# Patient Record
Sex: MALE | Race: BLACK OR AFRICAN AMERICAN | NOT HISPANIC OR LATINO
[De-identification: names, ages, dates, MRNs, and addresses within clinical notes are randomized per-mention and may not be internally consistent; named-entity substitution may affect disease eponyms.]

---

## 2021-03-09 PROBLEM — Z00.00 ENCOUNTER FOR PREVENTIVE HEALTH EXAMINATION: Status: ACTIVE | Noted: 2021-03-09

## 2021-03-10 ENCOUNTER — APPOINTMENT (OUTPATIENT)
Dept: OTOLARYNGOLOGY | Facility: CLINIC | Age: 76
End: 2021-03-10
Payer: COMMERCIAL

## 2021-03-10 VITALS — HEIGHT: 71 IN | BODY MASS INDEX: 19.6 KG/M2 | WEIGHT: 140 LBS

## 2021-03-10 DIAGNOSIS — Z82.49 FAMILY HISTORY OF ISCHEMIC HEART DISEASE AND OTHER DISEASES OF THE CIRCULATORY SYSTEM: ICD-10-CM

## 2021-03-10 DIAGNOSIS — Z86.79 PERSONAL HISTORY OF OTHER DISEASES OF THE CIRCULATORY SYSTEM: ICD-10-CM

## 2021-03-10 DIAGNOSIS — Z78.9 OTHER SPECIFIED HEALTH STATUS: ICD-10-CM

## 2021-03-10 DIAGNOSIS — Z79.899 OTHER LONG TERM (CURRENT) DRUG THERAPY: ICD-10-CM

## 2021-03-10 PROCEDURE — 99203 OFFICE O/P NEW LOW 30 MIN: CPT

## 2021-03-10 PROCEDURE — 99072 ADDL SUPL MATRL&STAF TM PHE: CPT

## 2021-03-11 PROBLEM — Z86.79 HISTORY OF HYPERTENSION: Status: RESOLVED | Noted: 2021-03-10 | Resolved: 2021-03-11

## 2021-03-11 PROBLEM — Z79.899 MEDICAL MARIJUANA USE: Status: ACTIVE | Noted: 2021-03-11

## 2021-03-11 PROBLEM — Z82.49 FAMILY HISTORY OF HYPERTENSION: Status: ACTIVE | Noted: 2021-03-10

## 2021-03-11 PROBLEM — Z78.9 DRINKS WINE: Status: ACTIVE | Noted: 2021-03-10

## 2021-03-11 NOTE — REVIEW OF SYSTEMS
[Patient Intake Form Reviewed] : Patient intake form was reviewed [Throat Pain] : throat pain [As Noted in HPI] : as noted in HPI [Feeling Poorly] : feeling poorly [Recent Weight Loss (___ Lbs)] : recent [unfilled] ~Ulb weight loss [Negative] : Heme/Lymph

## 2021-03-18 NOTE — HISTORY OF PRESENT ILLNESS
[de-identified] : 75M with hx HTN and colon resection for obstruction, presents with left submandibular mass x many years.  It will occasional become painful and swollen and usually resolves with ABX.  It is now swollen and painful x 2.5 weeks, he was given Bactrim without relief.  It is causing difficulty swallowing and eating due to pain, + weight loss.  He does not recall any prior imaging done.  His intake form/history erroneously mentions thyroid when he meant L SMG.

## 2021-03-18 NOTE — ASSESSMENT
[FreeTextEntry1] : 75M with large left SMG mass x many years, now with pain, difficulty eating and weight loss.  Discussed possibility of chronic sialoadenitis vs malignancy.\par \par Plan:\par - MRI to assess left SMG mass and r/o malignancy.\par - Will likely need FNA and excision.\par - Will call pt with MRI results and plan.\par - Pt verbalized understanding of above.

## 2021-03-18 NOTE — PHYSICAL EXAM
[Normal] : orientation to person, place, and time: normal [FreeTextEntry1] : normal appearance, well groomed, thin, in no acute distress [de-identified] : large, firm 4 cm left submandibular mass, abutting the floor of mouth mucosa, parotid and thyroid WNL

## 2021-03-21 ENCOUNTER — APPOINTMENT (OUTPATIENT)
Dept: MRI IMAGING | Facility: HOSPITAL | Age: 76
End: 2021-03-21
Payer: COMMERCIAL

## 2021-03-22 ENCOUNTER — RESULT REVIEW (OUTPATIENT)
Age: 76
End: 2021-03-22

## 2021-03-22 ENCOUNTER — OUTPATIENT (OUTPATIENT)
Dept: OUTPATIENT SERVICES | Facility: HOSPITAL | Age: 76
LOS: 1 days | End: 2021-03-22
Payer: COMMERCIAL

## 2021-03-22 PROCEDURE — A9585: CPT

## 2021-03-22 PROCEDURE — 70543 MRI ORBT/FAC/NCK W/O &W/DYE: CPT

## 2021-03-22 PROCEDURE — 70543 MRI ORBT/FAC/NCK W/O &W/DYE: CPT | Mod: 26

## 2021-04-03 ENCOUNTER — NON-APPOINTMENT (OUTPATIENT)
Age: 76
End: 2021-04-03

## 2021-04-08 ENCOUNTER — NON-APPOINTMENT (OUTPATIENT)
Age: 76
End: 2021-04-08

## 2021-04-08 ENCOUNTER — APPOINTMENT (OUTPATIENT)
Dept: INTERNAL MEDICINE | Facility: CLINIC | Age: 76
End: 2021-04-08
Payer: COMMERCIAL

## 2021-04-08 VITALS
TEMPERATURE: 98.5 F | SYSTOLIC BLOOD PRESSURE: 154 MMHG | BODY MASS INDEX: 20.13 KG/M2 | DIASTOLIC BLOOD PRESSURE: 78 MMHG | OXYGEN SATURATION: 96 % | WEIGHT: 144.3 LBS | HEART RATE: 97 BPM

## 2021-04-08 DIAGNOSIS — I10 ESSENTIAL (PRIMARY) HYPERTENSION: ICD-10-CM

## 2021-04-08 DIAGNOSIS — K50.90 CROHN'S DISEASE, UNSPECIFIED, W/OUT COMPLICATIONS: ICD-10-CM

## 2021-04-08 DIAGNOSIS — Z01.818 ENCOUNTER FOR OTHER PREPROCEDURAL EXAMINATION: ICD-10-CM

## 2021-04-08 PROCEDURE — 99204 OFFICE O/P NEW MOD 45 MIN: CPT | Mod: 25

## 2021-04-08 PROCEDURE — 93000 ELECTROCARDIOGRAM COMPLETE: CPT

## 2021-04-08 PROCEDURE — 99072 ADDL SUPL MATRL&STAF TM PHE: CPT

## 2021-04-08 RX ORDER — HYDROCHLOROTHIAZIDE 12.5 MG/1
12.5 TABLET ORAL DAILY
Qty: 90 | Refills: 3 | Status: ACTIVE | COMMUNITY

## 2021-04-09 LAB
ALBUMIN SERPL ELPH-MCNC: 4.5 G/DL
ALP BLD-CCNC: 113 U/L
ALT SERPL-CCNC: 16 U/L
ANION GAP SERPL CALC-SCNC: 12 MMOL/L
APPEARANCE: CLEAR
APTT BLD: 35.9 SEC
AST SERPL-CCNC: 18 U/L
BASOPHILS # BLD AUTO: 0.01 K/UL
BASOPHILS NFR BLD AUTO: 0.2 %
BILIRUB SERPL-MCNC: 0.6 MG/DL
BILIRUBIN URINE: NEGATIVE
BLOOD URINE: NEGATIVE
BUN SERPL-MCNC: 26 MG/DL
CALCIUM SERPL-MCNC: 11 MG/DL
CHLORIDE SERPL-SCNC: 103 MMOL/L
CO2 SERPL-SCNC: 29 MMOL/L
COLOR: NORMAL
CREAT SERPL-MCNC: 1.25 MG/DL
EOSINOPHIL # BLD AUTO: 0.06 K/UL
EOSINOPHIL NFR BLD AUTO: 0.9 %
GLUCOSE QUALITATIVE U: NEGATIVE
GLUCOSE SERPL-MCNC: 96 MG/DL
HCT VFR BLD CALC: 36.6 %
HGB BLD-MCNC: 11.6 G/DL
IMM GRANULOCYTES NFR BLD AUTO: 0.3 %
INR PPP: 0.96 RATIO
KETONES URINE: NEGATIVE
LEUKOCYTE ESTERASE URINE: NEGATIVE
LYMPHOCYTES # BLD AUTO: 1.66 K/UL
LYMPHOCYTES NFR BLD AUTO: 25.9 %
MAN DIFF?: NORMAL
MCHC RBC-ENTMCNC: 31 PG
MCHC RBC-ENTMCNC: 31.7 GM/DL
MCV RBC AUTO: 97.9 FL
MONOCYTES # BLD AUTO: 0.6 K/UL
MONOCYTES NFR BLD AUTO: 9.4 %
NEUTROPHILS # BLD AUTO: 4.06 K/UL
NEUTROPHILS NFR BLD AUTO: 63.3 %
NITRITE URINE: NEGATIVE
PH URINE: 6
PLATELET # BLD AUTO: 217 K/UL
POTASSIUM SERPL-SCNC: 4 MMOL/L
PROT SERPL-MCNC: 7 G/DL
PROTEIN URINE: NEGATIVE
PT BLD: 11.3 SEC
RBC # BLD: 3.74 M/UL
RBC # FLD: 14.5 %
SODIUM SERPL-SCNC: 144 MMOL/L
SPECIFIC GRAVITY URINE: 1.02
UROBILINOGEN URINE: NORMAL
WBC # FLD AUTO: 6.41 K/UL

## 2021-04-09 NOTE — HISTORY OF PRESENT ILLNESS
[No Pertinent Cardiac History] : no history of aortic stenosis, atrial fibrillation, coronary artery disease, recent myocardial infarction, or implantable device/pacemaker [No Pertinent Pulmonary History] : no history of asthma, COPD, sleep apnea, or smoking [No Adverse Anesthesia Reaction] : no adverse anesthesia reaction in self or family member [(Patient denies any chest pain, claudication, dyspnea on exertion, orthopnea, palpitations or syncope)] : Patient denies any chest pain, claudication, dyspnea on exertion, orthopnea, palpitations or syncope [Excellent (>10 METs)] : Excellent (>10 METs) [Chronic Anticoagulation] : no chronic anticoagulation [Chronic Kidney Disease] : no chronic kidney disease [Diabetes] : no diabetes [FreeTextEntry1] : LEFT submandibular mass excision [FreeTextEntry2] : 4/22/2021 [FreeTextEntry3] : Dr. Maya [FreeTextEntry4] : LEFT submandibular swelling, intermittent over the past 2 months, found to have large stone on MRI. scheduled for excision.

## 2021-04-14 DIAGNOSIS — Z01.818 ENCOUNTER FOR OTHER PREPROCEDURAL EXAMINATION: ICD-10-CM

## 2021-04-24 ENCOUNTER — APPOINTMENT (OUTPATIENT)
Dept: DISASTER EMERGENCY | Facility: CLINIC | Age: 76
End: 2021-04-24

## 2021-04-24 ENCOUNTER — LABORATORY RESULT (OUTPATIENT)
Age: 76
End: 2021-04-24

## 2021-04-26 ENCOUNTER — OUTPATIENT (OUTPATIENT)
Dept: OUTPATIENT SERVICES | Facility: HOSPITAL | Age: 76
LOS: 1 days | End: 2021-04-26
Payer: COMMERCIAL

## 2021-04-26 ENCOUNTER — TRANSCRIPTION ENCOUNTER (OUTPATIENT)
Age: 76
End: 2021-04-26

## 2021-04-26 PROCEDURE — 71045 X-RAY EXAM CHEST 1 VIEW: CPT

## 2021-04-26 PROCEDURE — 71045 X-RAY EXAM CHEST 1 VIEW: CPT | Mod: 26

## 2021-04-27 ENCOUNTER — INPATIENT (INPATIENT)
Facility: HOSPITAL | Age: 76
LOS: 0 days | Discharge: HOME | DRG: 139 | End: 2021-04-28
Attending: OTOLARYNGOLOGY | Admitting: OTOLARYNGOLOGY
Payer: COMMERCIAL

## 2021-04-27 ENCOUNTER — APPOINTMENT (OUTPATIENT)
Dept: OTOLARYNGOLOGY | Facility: HOSPITAL | Age: 76
End: 2021-04-27

## 2021-04-27 ENCOUNTER — RESULT REVIEW (OUTPATIENT)
Age: 76
End: 2021-04-27

## 2021-04-27 ENCOUNTER — OUTPATIENT (OUTPATIENT)
Dept: OUTPATIENT SERVICES | Facility: HOSPITAL | Age: 76
LOS: 1 days | Discharge: HOME | End: 2021-04-27
Payer: COMMERCIAL

## 2021-04-27 VITALS
DIASTOLIC BLOOD PRESSURE: 65 MMHG | TEMPERATURE: 99 F | SYSTOLIC BLOOD PRESSURE: 142 MMHG | OXYGEN SATURATION: 98 % | RESPIRATION RATE: 18 BRPM | HEART RATE: 58 BPM

## 2021-04-27 PROCEDURE — 31237 NSL/SINS NDSC SURG BX POLYPC: CPT | Mod: LT

## 2021-04-27 PROCEDURE — 42440 EXCISE SUBMAXILLARY GLAND: CPT | Mod: LT

## 2021-04-27 PROCEDURE — 88331 PATH CONSLTJ SURG 1 BLK 1SPC: CPT | Mod: 26

## 2021-04-27 PROCEDURE — 92516 FACIAL NERVE FUNCTION TEST: CPT

## 2021-04-27 PROCEDURE — 88305 TISSUE EXAM BY PATHOLOGIST: CPT | Mod: 26

## 2021-04-27 RX ORDER — AMLODIPINE BESYLATE 2.5 MG/1
5 TABLET ORAL DAILY
Refills: 0 | Status: DISCONTINUED | OUTPATIENT
Start: 2021-04-27 | End: 2021-04-28

## 2021-04-27 RX ORDER — HYDROMORPHONE HYDROCHLORIDE 2 MG/ML
1 INJECTION INTRAMUSCULAR; INTRAVENOUS; SUBCUTANEOUS EVERY 4 HOURS
Refills: 0 | Status: DISCONTINUED | OUTPATIENT
Start: 2021-04-27 | End: 2021-04-28

## 2021-04-27 RX ORDER — ONDANSETRON 8 MG/1
4 TABLET, FILM COATED ORAL EVERY 6 HOURS
Refills: 0 | Status: DISCONTINUED | OUTPATIENT
Start: 2021-04-27 | End: 2021-04-28

## 2021-04-27 RX ORDER — ACETAMINOPHEN 500 MG
650 TABLET ORAL EVERY 6 HOURS
Refills: 0 | Status: DISCONTINUED | OUTPATIENT
Start: 2021-04-27 | End: 2021-04-28

## 2021-04-27 RX ORDER — OXYCODONE HYDROCHLORIDE 5 MG/1
5 TABLET ORAL EVERY 6 HOURS
Refills: 0 | Status: DISCONTINUED | OUTPATIENT
Start: 2021-04-27 | End: 2021-04-28

## 2021-04-27 RX ORDER — LANOLIN ALCOHOL/MO/W.PET/CERES
3 CREAM (GRAM) TOPICAL AT BEDTIME
Refills: 0 | Status: DISCONTINUED | OUTPATIENT
Start: 2021-04-27 | End: 2021-04-28

## 2021-04-27 NOTE — H&P ADULT - HISTORY OF PRESENT ILLNESS
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 Transferred to [facility] for 23 hr obs.     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

## 2021-04-28 ENCOUNTER — TRANSCRIPTION ENCOUNTER (OUTPATIENT)
Age: 76
End: 2021-04-28

## 2021-04-28 VITALS
TEMPERATURE: 99 F | DIASTOLIC BLOOD PRESSURE: 71 MMHG | HEART RATE: 61 BPM | SYSTOLIC BLOOD PRESSURE: 145 MMHG | RESPIRATION RATE: 18 BRPM | OXYGEN SATURATION: 98 %

## 2021-04-28 LAB
COVID-19 SPIKE DOMAIN AB INTERP: NEGATIVE — SIGNIFICANT CHANGE UP
COVID-19 SPIKE DOMAIN ANTIBODY RESULT: 0.4 U/ML — SIGNIFICANT CHANGE UP
HCV AB S/CO SERPL IA: 0.03 S/CO — LOW
HCV AB SERPL-IMP: SIGNIFICANT CHANGE UP
SARS-COV-2 IGG+IGM SERPL QL IA: 0.4 U/ML — SIGNIFICANT CHANGE UP
SARS-COV-2 IGG+IGM SERPL QL IA: NEGATIVE — SIGNIFICANT CHANGE UP

## 2021-04-28 PROCEDURE — 86803 HEPATITIS C AB TEST: CPT

## 2021-04-28 PROCEDURE — 36415 COLL VENOUS BLD VENIPUNCTURE: CPT

## 2021-04-28 PROCEDURE — 86769 SARS-COV-2 COVID-19 ANTIBODY: CPT

## 2021-04-28 RX ADMIN — AMLODIPINE BESYLATE 5 MILLIGRAM(S): 2.5 TABLET ORAL at 13:20

## 2021-04-28 NOTE — DISCHARGE NOTE NURSING/CASE MANAGEMENT/SOCIAL WORK - PATIENT PORTAL LINK FT
You can access the FollowMyHealth Patient Portal offered by [facility] by registering at the following website: [email or web address]. By joining [employer]’s FollowMyHealth portal, you will also be able to view your health information using other applications (apps) compatible with our system.

## 2021-04-28 NOTE — DISCHARGE NOTE PROVIDER - NSDCCPCAREPLAN_GEN_ALL_CORE_FT
PRINCIPAL DISCHARGE DIAGNOSIS  Diagnosis: Sialolithiasis  Assessment and Plan of Treatment: You had a submandibular gland stone that was causing inflammation/infection of your submandibular gland. this most often occurs due to chronic dehydration. It is very important to hydrate with 8 glasses of water daily.

## 2021-04-28 NOTE — PROGRESS NOTE ADULT - SUBJECTIVE AND OBJECTIVE BOX
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 Transferred to [facility] for 23 hr obs.     INTERVAL:  4/28: NAEON. Pain well controlled. No other issues.    PAST MEDICAL & SURGICAL HISTORY:    No Known Allergies    MEDICATIONS  (STANDING):  amLODIPine   Tablet 5 milliGRAM(s) Oral daily    MEDICATIONS  (PRN):  acetaminophen    Suspension .. 650 milliGRAM(s) Oral every 6 hours PRN Mild Pain (1 - 3)  HYDROmorphone  Injectable 1 milliGRAM(s) IV Push every 4 hours PRN Severe Pain (7 - 10)  melatonin 3 milliGRAM(s) Oral at bedtime PRN Insomnia  ondansetron Injectable 4 milliGRAM(s) IV Push every 6 hours PRN Nausea  oxyCODONE    IR 5 milliGRAM(s) Oral every 6 hours PRN Moderate Pain (4 - 6)      Objective    ICU Vital Signs Last 24 Hrs  T(C): 36.9 (28 Apr 2021 04:43), Max: 37 (27 Apr 2021 21:46)  T(F): 98.4 (28 Apr 2021 04:43), Max: 98.6 (27 Apr 2021 21:46)  HR: 58 (28 Apr 2021 04:43) (54 - 58)  BP: 120/66 (28 Apr 2021 04:43) (117/64 - 142/65)  BP(mean): --  ABP: --  ABP(mean): --  RR: 18 (28 Apr 2021 04:43) (18 - 18)  SpO2: 96% (28 Apr 2021 04:43) (96% - 98%)      PHYSICAL EXAM:    CONSTITUTIONAL: Well nourished, well developed, NON-DYSMORPHIC, and in no acute distress.    HEAD: normocephalic, atraumatic.  EARS: The right/left pinna was normal.   NOSE: Normal external nose.   ORAL CAVITY/OROPHARYNX: normal mucosa. No erythema, lesions or bleeding.  NECK: L neck incision c/d/i  RESPIRATORY: Respirations unlabored, no increased work of breathing with use of accessory muscles and retractions. No stridor.  CARDIAC: Warm extremities, no cyanosis.               I&O's Summary    27 Apr 2021 07:01 - 28 Apr 2021 06:29  --------------------------------------------------------  IN: 0 mL / OUT: 555 mL / NET: -555 mL

## 2021-04-28 NOTE — DISCHARGE NOTE PROVIDER - NSDCFUADDINST_GEN_ALL_CORE_FT
General Discharge Instructions:  Please resume all regular home medications unless specifically advised not to take a particular medication. Also, please take any new medications as prescribed.  Please get plenty of rest, continue to ambulate several times per day, and drink adequate amounts of fluids. Avoid lifting weights greater than 5-10 lbs until you follow-up with your surgeon, who will instruct you further regarding activity restrictions.  Avoid driving or operating heavy machinery while taking pain medications.  Please follow-up with your surgeon and Primary Care Provider (PCP) as advised.  Incision Care:  *Please call your doctor or nurse practitioner if you have increased pain, swelling, redness, or drainage from the incision site.  *Avoid swimming and baths until your follow-up appointment.  *You may shower, and wash surgical incisions with a mild soap and warm water. Gently pat the area dry.  *If you have steri-strips, they will fall off on their own. Please remove any remaining strips 7-10 days after surgery.    Warning Signs:  Please call your doctor or nurse practitioner if you experience the following:  *You experience new chest pain, pressure, squeezing or tightness.  *New or worsening cough, shortness of breath, or wheeze.  *If you are vomiting and cannot keep down fluids or your medications.  *You are getting dehydrated due to continued vomiting, diarrhea, or other reasons. Signs of dehydration include dry mouth, rapid heartbeat, or feeling dizzy or faint when standing.  *You experience burning when you urinate, have blood in your urine, or experience a discharge.  *Your pain is not improving within 8-12 hours or is not gone within 24 hours.  *You have shaking chills, or fever greater than 101.5 degrees Fahrenheit or 38 degrees Celsius.  *Any change in your symptoms, or any new symptoms that concern you.

## 2021-04-28 NOTE — DISCHARGE NOTE PROVIDER - PROVIDER TOKENS
PROVIDER:[TOKEN:[[id number]]] PROVIDER:[TOKEN:[[id number]],SCHEDULEDAPPT:[05/05/2021],ESTABLISHEDPATIENT:[T]]

## 2021-04-28 NOTE — DISCHARGE NOTE PROVIDER - CARE PROVIDERS DIRECT ADDRESSES
[email or web address]

## 2021-04-28 NOTE — PROGRESS NOTE ADULT - ASSESSMENT
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 Transferred to [facility] for 23 hr obs    - Reg diet/dc fluids when tolerating  - Pain/nausea control  - FARHAD monitoring  - SCDs  - Home meds

## 2021-04-28 NOTE — DISCHARGE NOTE PROVIDER - NSDCCPTREATMENT_GEN_ALL_CORE_FT
PRINCIPAL PROCEDURE  Procedure: Excision, submandibular gland  Findings and Treatment: We removed part of the submandibular gland to be able to remove a stone from the gland causing you chronic infection/inflammation as well we performed a biopsy of a nasal lesion. Your pathology results will come back in 5-7 buisiness days and you will go over them with Dr. Maya in office.  Please keep hydrated.

## 2021-04-28 NOTE — DISCHARGE NOTE PROVIDER - HOSPITAL COURSE
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 Transferred to [facility] for 23 hr obs.     No acute events overnight. Pt is breathing comfortably on room air. Tolerating diet with no nausea/vomiting. Pain well controlled on current regimen. Ready for discharge home.

## 2021-04-28 NOTE — DISCHARGE NOTE NURSING/CASE MANAGEMENT/SOCIAL WORK - NSDCPNINST_GEN_ALL_CORE
Call Dr. Maya if you have any questions or concerns. Due to pandemic corona virus- please continue proper handwashing/hand hygiene, wearing mask, social distancing. Educational material given- Surgical Wound Discharge Instructions ([employer]).

## 2021-04-28 NOTE — DISCHARGE NOTE PROVIDER - CARE PROVIDER_API CALL
Richard Maya)  Otolaryngology  [address] [place] [address]  Phone: [phone]  Fax: [phone]  Follow Up Time:    Richard Maya)  Otolaryngology  [address] [place] [address]  Phone: [phone]  Fax: [phone]  Established Patient  Scheduled Appointment: 05/05/2021

## 2021-04-29 LAB — SURGICAL PATHOLOGY STUDY: SIGNIFICANT CHANGE UP

## 2021-05-04 DIAGNOSIS — K11.23 CHRONIC SIALOADENITIS: ICD-10-CM

## 2021-05-04 DIAGNOSIS — K50.90 CROHN'S DISEASE, UNSPECIFIED, WITHOUT COMPLICATIONS: ICD-10-CM

## 2021-05-04 DIAGNOSIS — K11.5 SIALOLITHIASIS: ICD-10-CM

## 2021-05-04 DIAGNOSIS — D36.7 BENIGN NEOPLASM OF OTHER SPECIFIED SITES: ICD-10-CM

## 2021-05-04 DIAGNOSIS — I10 ESSENTIAL (PRIMARY) HYPERTENSION: ICD-10-CM

## 2021-05-05 ENCOUNTER — APPOINTMENT (OUTPATIENT)
Dept: OTOLARYNGOLOGY | Facility: CLINIC | Age: 76
End: 2021-05-05
Payer: COMMERCIAL

## 2021-05-05 DIAGNOSIS — R22.0 LOCALIZED SWELLING, MASS AND LUMP, HEAD: ICD-10-CM

## 2021-05-05 DIAGNOSIS — D14.0 BENIGN NEOPLASM OF MIDDLE EAR, NASAL CAVITY AND ACCESSORY SINUSES: ICD-10-CM

## 2021-05-05 PROCEDURE — 99024 POSTOP FOLLOW-UP VISIT: CPT

## 2021-05-05 NOTE — ASSESSMENT
[FreeTextEntry1] : 75M s/p 4/27/21 left submandibular excision and left nasal biopsy for submandibular sialolithiasis, and nasal inverted papilloma. \par We thoroughly discussed the diagnosis with the patient and explained the required workup, f/u and treatment options.\par \par Plan:\par - OR for endoscopic medial maxillectomy (CT guidance)\par - Please apply vitamin E cream over scar, avoid sun exposure.\par - You can wash the scar with water and soap, scrub gently.\par - f/u 4-6 weeks.\par - if any concerns/questions arise, please contact our clinic. \par - Pt verbalized understanding of above.\par

## 2021-05-05 NOTE — REVIEW OF SYSTEMS
[Throat Pain] : throat pain [As Noted in HPI] : as noted in HPI [Feeling Poorly] : feeling poorly [Recent Weight Loss (___ Lbs)] : recent [unfilled] ~Ulb weight loss [Negative] : Heme/Lymph

## 2021-05-05 NOTE — DATA REVIEWED
[No studies available for review at this time] : No studies available for review at this time [de-identified] : final path.

## 2021-05-05 NOTE — REASON FOR VISIT
[Post-Operative Visit] : a post-operative visit [FreeTextEntry1] : s/p left submandibular excision and left nasal biopsy

## 2021-05-05 NOTE — HISTORY OF PRESENT ILLNESS
[de-identified] : 75M with hx HTN and colon resection for obstruction, presents with left submandibular mass x many years.  It will occasional become painful and swollen and usually resolves with ABX.  It is now swollen and painful x 2.5 weeks, he was given Bactrim without relief.  It is causing difficulty swallowing and eating due to pain, + weight loss.  He does not recall any prior imaging done.  His intake form/history erroneously mentions thyroid when he meant L SMG.\par \par MRI scan revealed a left inflamed submandibular with a large sialolithiasis, and an incidental unilateral left mid-meatal nasal polyp.  [FreeTextEntry1] : s/p 4/27/21 left submandibular excision and left nasal biopsy\par pt. denies pain, denies neck swelling/redness/discharge. denies epistaxis/nasal complains. reports mild tongue "tightness".\par  \par final path: \par 1. Left lateral nasal wall neoplasm:\par - Inverted papilloma\par \par 2. Left submandibular gland and stone:\par - Chronic sialoadenitis\par - Calculus, gross diagnosis

## 2021-05-05 NOTE — PHYSICAL EXAM
[Normal] : orientation to person, place, and time: normal [FreeTextEntry1] : normal appearance, well groomed, thin, in no acute distress [de-identified] : well healing neck scar, no swelling/redness/discharge, stitches were removed.

## 2022-04-06 ENCOUNTER — APPOINTMENT (OUTPATIENT)
Dept: ORTHOPEDIC SURGERY | Facility: CLINIC | Age: 77
End: 2022-04-06
Payer: COMMERCIAL

## 2022-04-06 VITALS — WEIGHT: 144 LBS | BODY MASS INDEX: 20.16 KG/M2 | HEIGHT: 71 IN

## 2022-04-06 DIAGNOSIS — R29.898 OTHER SYMPTOMS AND SIGNS INVOLVING THE MUSCULOSKELETAL SYSTEM: ICD-10-CM

## 2022-04-06 PROCEDURE — 99212 OFFICE O/P EST SF 10 MIN: CPT

## 2022-04-07 NOTE — ASSESSMENT
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 \par Discussed recommendations to obtain MRI of the right shoulder to r/o RCT.\par reports abdominal pain with some nsaids. Prescribed Mobic.\par May be developing frozen shoulder\par \par Impression: right shoulder\par 1. Moderate partial tearing of the supraspinatus tendon insertion, mild partial tearing of the subscapularis tendon insertion and moderate biceps tenosynovitis with superior and anterior labral tearing, moderate effusion and moderate capsulitis.\par 2. AC joint arthrosis.\par 3. Moderate subacromial bursitis.\par 4. No acute fracture or disproportionate muscle atrophy.\par E- signed by Tu Castaneda MD 10/30/2021\par I reviewed and agree with the results. \par \par Discussed MRI findings and pathology of the shoulder, No surgical intervention recommended, Incomplete tears w/ global inflammation. Recommended CSI injection, PT elected to proceed with injection.\par \par Will repeat PT\par Due to weakness, obtain EMG testing of RUE to r/o cervical radiculopathy\par \par Impressions: EMG RUE\par 1. Right sided, mild, acute and chronic C5 C6 radiculopathy. 2. Mild R sensorimotor demyelinating median nerve neuropathy at the wrist. This is consistent with the clinical diagnosis of mild right carpal tunnel syndrome. \par 3. R sensorimotor demyelinating ulnar nerve neuropathy at the elbow. This is consistent with the clinical diagnosis of mild right cubital tunnel syndrome. \par Mark Veronica 3/30/22\par I reviewed and agree with the results. \par \par Underlying pathology reviewed\par Treatment options discussed. \par HEP encouraged. \par Minimal relief previous steroid injection. \par Prescribed mobic\par Questions addressed.\par f/u 4 wks or as needed. \par

## 2022-04-07 NOTE — PHYSICAL EXAM
[Right] : right shoulder [4 ___] : forward flexion 4[unfilled]/5 [4___] : abduction 4[unfilled]/5 [] : no sensory deficits

## 2022-04-07 NOTE — DATA REVIEWED
[Report was reviewed and noted in the chart] : The report was reviewed and noted in the chart [FreeTextEntry1] : EMG RUE

## 2022-04-07 NOTE — HISTORY OF PRESENT ILLNESS
[Gradual] : gradual [7] : 7 [4] : 4 [Radiating] : radiating [Tightness] : tightness [Occasional] : occasional [Work] : work [Sleep] : sleep [Rest] : rest [Meds] : meds [Ice] : ice [Heat] : heat [Massage] : massage [Physical therapy] : physical therapy [Lying in bed] : lying in bed [de-identified] : Last Note: 3/23/22: f/u on the right shoulder, some pain with specific motions.   11/3/21: PT present to review MRI of the right shoulder\par \par 11/3/21: PT present to review MRI of the right shoulder\par \par 10/27/2021: 75YO male patient present for right shoulder pain few months. PT reports gradually worsening pain, and possibly a result of repetitive motions. [] : no [de-identified] : reaching out  [de-identified] : 4/2022 [de-identified] : Physical therapy

## 2022-04-29 ENCOUNTER — NON-APPOINTMENT (OUTPATIENT)
Age: 77
End: 2022-04-29

## 2022-04-29 ENCOUNTER — APPOINTMENT (OUTPATIENT)
Dept: UROLOGY | Facility: CLINIC | Age: 77
End: 2022-04-29
Payer: COMMERCIAL

## 2022-04-29 VITALS
HEART RATE: 94 BPM | BODY MASS INDEX: 20.3 KG/M2 | SYSTOLIC BLOOD PRESSURE: 142 MMHG | TEMPERATURE: 98.7 F | WEIGHT: 145 LBS | DIASTOLIC BLOOD PRESSURE: 80 MMHG | HEIGHT: 71 IN

## 2022-04-29 PROCEDURE — 76857 US EXAM PELVIC LIMITED: CPT

## 2022-04-29 PROCEDURE — 99203 OFFICE O/P NEW LOW 30 MIN: CPT

## 2022-04-29 NOTE — HISTORY OF PRESENT ILLNESS
[FreeTextEntry1] : 75 YO M seen TODAY 4/29/2022 as NPT for elevated PSA. He told me that he has had a PSA of ~4 since 7 years ago when he underwent a PNB for it that returned BPH by history. HE had a recent PSA done 4/22/2022 which returned 4.2 (19%). He has also had chronic pelvic pain and is concerned tat he might have prostate cancer.\par \par Patient describes his urination as weak flow but no nocturia, dysuria or hematuria. \par UA negative\par IPSS 2\par ADRIENNE 24\par KIMO 2\par Pelvic US today:\par Bladder PVR: 12 cc\par PRostate 28 gm\par \par Patient has chronic right shoulder pain and is on pain medication for this. Also on olmesartan and amlodipine for HTN, Crohn's disease on no medication at present. NKMA.\par  The patient denies fevers, chills, nausea and or vomiting and no unexplained weight loss. \par All pertinent parts of the patient PFSH (past medical, family and social histories), laboratory, radiological studies and physician notes were reviewed prior to starting the face to face portion of the  visit. Questionnaire results were discussed with patient.\par

## 2022-04-29 NOTE — ASSESSMENT
[FreeTextEntry1] : 76-year-old male with a high normal for age and stable PSA in the 4 range but a abnormally low percent free PSA of 19%.  Digital rectal exam today is normal.  We discussed the implications of these findings as they coincide with the chance of prostate cancer.  I also discussed with the patient normal ranges for PSA for his age which would include a total PSA up to 5-5.5.  We also discussed the length the time of progression of prostate cancer which is measured in years or decades rather than weeks or months.  I repeated the total and free PSA today for confirmation.  If this remains in the same range, then I recommended to the patient that he undergo repeat evaluation with LINDA and PSA in 6 months and we planned on that follow-up.  Of course we will evaluate any further abnormality as we find it.  We discussed the very minimal risk that waiting 6 months would cause us to lose any window of opportunity for treatment of localized disease versus more generalized spread, especially in view of the relatively normal PSA value he has right now.  We also discussed the fact that most prostate cancers carry a low likelihood of metastasis and can be observed rather than treated.\par \par I believe I answered patient's questions to his satisfaction and he is satisfied with the present course of action. Ella Loco MD\par

## 2022-04-29 NOTE — LETTER BODY
[Dear  ___] : Dear  [unfilled], [Consult Letter:] : I had the pleasure of evaluating your patient, [unfilled]. [Please see my note below.] : Please see my note below. [Consult Closing:] : Thank you very much for allowing me to participate in the care of this patient.  If you have any questions, please do not hesitate to contact me. [FreeTextEntry3] : Best Regards, \par \par Ella Loco MD\par

## 2022-04-29 NOTE — PHYSICAL EXAM
[General Appearance - Well Developed] : well developed [General Appearance - Well Nourished] : well nourished [Normal Appearance] : normal appearance [Well Groomed] : well groomed [General Appearance - In No Acute Distress] : no acute distress [Edema] : no peripheral edema [Respiration, Rhythm And Depth] : normal respiratory rhythm and effort [Exaggerated Use Of Accessory Muscles For Inspiration] : no accessory muscle use [Abdomen Soft] : soft [Abdomen Tenderness] : non-tender [Costovertebral Angle Tenderness] : no ~M costovertebral angle tenderness [Urethral Meatus] : meatus normal [Penis Abnormality] : normal uncircumcised penis [Urinary Bladder Findings] : the bladder was normal on palpation [Scrotum] : the scrotum was normal [Epididymis] : the epididymides were normal [Testes Tenderness] : no tenderness of the testes [Testes Mass (___cm)] : there were no testicular masses [Prostate Tenderness] : the prostate was not tender [No Prostate Nodules] : no prostate nodules [Prostate Size ___ gm] : prostate size [unfilled] gm [Normal Station and Gait] : the gait and station were normal for the patient's age [] : no rash [No Focal Deficits] : no focal deficits [Oriented To Time, Place, And Person] : oriented to person, place, and time [Affect] : the affect was normal [Mood] : the mood was normal [Not Anxious] : not anxious

## 2022-05-05 LAB
BILIRUB UR QL STRIP: NORMAL
CLARITY UR: CLEAR
COLLECTION METHOD: NORMAL
GLUCOSE UR-MCNC: NORMAL
HCG UR QL: 0.2 EU/DL
HGB UR QL STRIP.AUTO: NORMAL
KETONES UR-MCNC: NORMAL
LEUKOCYTE ESTERASE UR QL STRIP: NORMAL
NITRITE UR QL STRIP: NORMAL
PH UR STRIP: 5.5
PROT UR STRIP-MCNC: NORMAL
PSA FREE FLD-MCNC: 15 %
PSA FREE SERPL-MCNC: 0.86 NG/ML
PSA SERPL-MCNC: 5.88 NG/ML
SP GR UR STRIP: 1.02

## 2022-05-09 ENCOUNTER — NON-APPOINTMENT (OUTPATIENT)
Age: 77
End: 2022-05-09

## 2022-05-11 ENCOUNTER — NON-APPOINTMENT (OUTPATIENT)
Age: 77
End: 2022-05-11

## 2022-05-12 ENCOUNTER — NON-APPOINTMENT (OUTPATIENT)
Age: 77
End: 2022-05-12

## 2022-05-13 ENCOUNTER — APPOINTMENT (OUTPATIENT)
Dept: UROLOGY | Facility: CLINIC | Age: 77
End: 2022-05-13
Payer: COMMERCIAL

## 2022-05-13 VITALS — TEMPERATURE: 94.6 F | DIASTOLIC BLOOD PRESSURE: 84 MMHG | SYSTOLIC BLOOD PRESSURE: 144 MMHG | HEART RATE: 89 BPM

## 2022-05-13 LAB
BILIRUB UR QL STRIP: NORMAL
CLARITY UR: CLEAR
COLLECTION METHOD: NORMAL
GLUCOSE UR-MCNC: NORMAL
HCG UR QL: 0.2 EU/DL
HGB UR QL STRIP.AUTO: NORMAL
KETONES UR-MCNC: NORMAL
LEUKOCYTE ESTERASE UR QL STRIP: NORMAL
NITRITE UR QL STRIP: NORMAL
PH UR STRIP: 5.5
PROT UR STRIP-MCNC: NORMAL
SP GR UR STRIP: 1.02

## 2022-05-13 PROCEDURE — 81003 URINALYSIS AUTO W/O SCOPE: CPT | Mod: QW

## 2022-05-13 PROCEDURE — 99214 OFFICE O/P EST MOD 30 MIN: CPT

## 2022-05-13 PROCEDURE — 76857 US EXAM PELVIC LIMITED: CPT

## 2022-05-13 NOTE — PHYSICAL EXAM
[General Appearance - Well Developed] : well developed [Normal Appearance] : normal appearance [Well Groomed] : well groomed [General Appearance - In No Acute Distress] : no acute distress [Abdomen Soft] : soft [Abdomen Tenderness] : non-tender [Abdomen Hernia] : no hernia was discovered [Costovertebral Angle Tenderness] : no ~M costovertebral angle tenderness [Oriented To Time, Place, And Person] : oriented to person, place, and time [Affect] : the affect was normal [Mood] : the mood was normal [Not Anxious] : not anxious [Normal Station and Gait] : the gait and station were normal for the patient's age [FreeTextEntry1] : No pain on palpation or percussion over iliac crests and ischial tuberosities. [No Focal Deficits] : no focal deficits

## 2022-05-13 NOTE — ASSESSMENT
[FreeTextEntry1] : 76-year-old black male with elevated PSA and pain in the buttocks with sitting which does not appear to be related to his prostate gland either infection or malignancy, in the face of normal and stable PVR, prostate measurement, UA, PSA under 8.  I discussed these findings with the patient in detail recommended we continue as planned with the MRI to evaluate his elevated PSA for possible biopsy.  I reviewed with him scenarios whereby prostate cancer could cause bone pain, including the diffuse nature of such bone pain and the typically markedly elevated PSA in the 100s to thousands range in such cases.  The patient seemed to be satisfied with this explanation and will proceed with the MRI as planned.  We will meet review these results afterwards. Ella Loco MD\par

## 2022-05-13 NOTE — HISTORY OF PRESENT ILLNESS
[FreeTextEntry1] : 75 YO M seen TODAY 4/29/2022 as NPT for elevated PSA. He told me that he has had a PSA of ~4 since 7 years ago when he underwent a PNB for it that returned BPH by history. HE had a recent PSA done 4/22/2022 which returned 4.2 (19%). He has also had chronic pelvic pain and is concerned tat he might have prostate cancer.\par \par Patient describes his urination as weak flow but no nocturia, dysuria or hematuria. \par UA negative\par IPSS 2\par ADRIENNE 24\par KIMO 2\par Pelvic US today:\par Bladder PVR: 12 cc\par PRostate 28 gm\par 76-year-old male with a high normal for age and stable PSA in the 4 range but a abnormally low percent free PSA of 19%. Digital rectal exam today is normal. We discussed the implications of these findings as they coincide with the chance of prostate cancer. I also discussed with the patient normal ranges for PSA for his age which would include a total PSA up to 5-5.5. We also discussed the length the time of progression of prostate cancer which is measured in years or decades rather than weeks or months. I repeated the total and free PSA today for confirmation. If this remains in the same range, then I recommended to the patient that he undergo repeat evaluation with LINDA and PSA in 6 months and we planned on that follow-up. Of course we will evaluate any further abnormality as we find it. We discussed the very minimal risk that waiting 6 months would cause us to lose any window of opportunity for treatment of localized disease versus more generalized spread, especially in view of the relatively normal PSA value he has right now. We also discussed the fact that most prostate cancers carry a low likelihood of metastasis and can be observed rather than treated.\par PA again higher at 5.88, % free PA low at 15%, recommend MR prostate now, discussed by phone, ordered, FU after that to discuss results and possible PNB.\par \par Patient seen TODAY 5/13/2022 due to complaint of pelvic pain and concern over prostate cancer. His MRI is scheduled for later in the month.  He describes the pain as in the buttocks and when he sits down. He is concerned that this is a sign of cancer spread.\par UA negative\par IPSS 11\par Pelvic US:\par PVR 30\par Prostate 27\par \par Patient has chronic right shoulder pain and is on pain medication for this. Also on olmesartan and amlodipine for HTN, Crohn's disease on no medication at present. NKMA.\par  The patient denies fevers, chills, nausea and or vomiting and no unexplained weight loss. \par All pertinent parts of the patient PFSH (past medical, family and social histories), laboratory, radiological studies and physician notes were reviewed prior to starting the face to face portion of the  visit. Questionnaire results were discussed with patient.\par

## 2022-05-13 NOTE — LETTER BODY
[Dear  ___] : Dear  [unfilled], [Courtesy Letter:] : I had the pleasure of seeing your patient, [unfilled], in my office today. [Please see my note below.] : Please see my note below. [Consult Closing:] : Thank you very much for allowing me to participate in the care of this patient.  If you have any questions, please do not hesitate to contact me. [FreeTextEntry3] : Best Regards, \par \par Ella Loco MD\par

## 2022-05-16 LAB
BACTERIA UR CULT: NORMAL
PSA FREE FLD-MCNC: 14 %
PSA FREE SERPL-MCNC: 0.83 NG/ML
PSA SERPL-MCNC: 5.72 NG/ML

## 2022-05-25 ENCOUNTER — APPOINTMENT (OUTPATIENT)
Dept: ORTHOPEDIC SURGERY | Facility: CLINIC | Age: 77
End: 2022-05-25
Payer: COMMERCIAL

## 2022-05-25 VITALS — BODY MASS INDEX: 19.86 KG/M2 | WEIGHT: 145 LBS | HEIGHT: 71.5 IN

## 2022-05-25 DIAGNOSIS — M54.12 RADICULOPATHY, CERVICAL REGION: ICD-10-CM

## 2022-05-25 DIAGNOSIS — M75.51 BURSITIS OF RIGHT SHOULDER: ICD-10-CM

## 2022-05-25 PROCEDURE — 99213 OFFICE O/P EST LOW 20 MIN: CPT | Mod: 25

## 2022-05-25 PROCEDURE — 20610 DRAIN/INJ JOINT/BURSA W/O US: CPT | Mod: RT

## 2022-05-25 NOTE — PROCEDURE
[Large Joint Injection] : Large joint injection [Right] : of the right [Knee] : knee [Pain] : pain [Alcohol] : alcohol [Betadine] : betadine [Ethyl Chloride sprayed topically] : ethyl chloride sprayed topically [Sterile technique used] : sterile technique used [___ cc    6mg] :  Betamethasone (Celestone) ~Vcc of 6mg [___ cc    1%] : Lidocaine ~Vcc of 1%  [] : Patient tolerated procedure well [Call if redness, pain or fever occur] : call if redness, pain or fever occur [Apply ice for 15min out of every hour for the next 12-24 hours as tolerated] : apply ice for 15 minutes out of every hour for the next 12-24 hours as tolerated [Patient was advised to rest the joint(s) for ____ days] : patient was advised to rest the joint(s) for [unfilled] days [Patient had decreased mobility in the joint] : patient had decreased mobility in the joint [Risks, benefits, alternatives discussed / Verbal consent obtained] : the risks benefits, and alternatives have been discussed, and verbal consent was obtained

## 2022-05-25 NOTE — HISTORY OF PRESENT ILLNESS
[7] : 7 [0] : 0 [FreeTextEntry1] : right shoulder  [de-identified] : Last Note: 3/23/22: f/u on the right shoulder, some pain with specific motions.   11/3/21: PT present to review MRI of the right shoulder\par \par 11/3/21: PT present to review MRI of the right shoulder\par \par 10/27/2021: 75YO male patient present for right shoulder pain few months. PT reports gradually worsening pain, and possibly a result of repetitive motions.

## 2022-05-25 NOTE — ASSESSMENT
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 \par Discussed recommendations to obtain MRI of the right shoulder to r/o RCT.\par reports abdominal pain with some nsaids. Prescribed Mobic.\par May be developing frozen shoulder\par \par Impression: right shoulder\par 1. Moderate partial tearing of the supraspinatus tendon insertion, mild partial tearing of the subscapularis tendon insertion and moderate biceps tenosynovitis with superior and anterior labral tearing, moderate effusion and moderate capsulitis.\par 2. AC joint arthrosis.\par 3. Moderate subacromial bursitis.\par 4. No acute fracture or disproportionate muscle atrophy.\par E- signed by Tu Castaneda MD 10/30/2021\par I reviewed and agree with the results. \par \par Discussed MRI findings and pathology of the shoulder, No surgical intervention recommended, Incomplete tears w/ global inflammation. Recommended CSI injection, PT elected to proceed with injection.\par \par Will repeat PT\par Due to weakness, obtain EMG testing of RUE to r/o cervical radiculopathy\par \par Impressions: EMG RUE\par 1. Right sided, mild, acute and chronic C5 C6 radiculopathy. 2. Mild R sensorimotor demyelinating median nerve neuropathy at the wrist. This is consistent with the clinical diagnosis of mild right carpal tunnel syndrome. \par 3. R sensorimotor demyelinating ulnar nerve neuropathy at the elbow. This is consistent with the clinical diagnosis of mild right cubital tunnel syndrome. \par Mark Veronica 3/30/22\par I reviewed and agree with the results. \par \par Underlying pathology reviewed\par Treatment options discussed. \par HEP encouraged. \par Minimal relief previous steroid injection. \par Prescribed mobic\par Questions addressed.\par f/u 4 wks or as needed. \par \par 5/25/22 A repeat steroid injection for the right shoulder was preformed today. \par Apply ice to affected area.\par

## 2022-05-25 NOTE — DISCUSSION/SUMMARY
[de-identified] : The documentation recorded by the scribe accurately reflects the service I personally performed and the decisions made by me.\par I, Thanh Moreira, attest that this documentation has been prepared under the direction and in the presence of Provider Giovany Bonner MD.\par \par The patient was seen by Giovany Bonner MD\par

## 2022-06-17 ENCOUNTER — APPOINTMENT (OUTPATIENT)
Dept: UROLOGY | Facility: CLINIC | Age: 77
End: 2022-06-17

## 2022-06-17 NOTE — HISTORY OF PRESENT ILLNESS
[FreeTextEntry1] : 77 YO M seen 4/29/2022 as NPT for elevated PSA. He told me that he has had a PSA of ~4 since 7 years ago when he underwent a PNB for it that returned BPH by history. HE had a recent PSA done 4/22/2022 which returned 4.2 (19%). He has also had chronic pelvic pain and is concerned tat he might have prostate cancer.\par \par Patient describes his urination as weak flow but no nocturia, dysuria or hematuria. \par UA negative\par IPSS 2\par ADRIENNE 24\par KIMO 2\par Pelvic US today:\par Bladder PVR: 12 cc\par PRostate 28 gm\par 76-year-old male with a high normal for age and stable PSA in the 4 range but a abnormally low percent free PSA of 19%. Digital rectal exam today is normal. We discussed the implications of these findings as they coincide with the chance of prostate cancer. I also discussed with the patient normal ranges for PSA for his age which would include a total PSA up to 5-5.5. We also discussed the length the time of progression of prostate cancer which is measured in years or decades rather than weeks or months. I repeated the total and free PSA today for confirmation. If this remains in the same range, then I recommended to the patient that he undergo repeat evaluation with LINDA and PSA in 6 months and we planned on that follow-up. Of course we will evaluate any further abnormality as we find it. We discussed the very minimal risk that waiting 6 months would cause us to lose any window of opportunity for treatment of localized disease versus more generalized spread, especially in view of the relatively normal PSA value he has right now. We also discussed the fact that most prostate cancers carry a low likelihood of metastasis and can be observed rather than treated.\par PA again higher at 5.88, % free PA low at 15%, recommend MR prostate now, discussed by phone, ordered, FU after that to discuss results and possible PNB.\par \par Patient seen 5/13/2022 due to complaint of pelvic pain and concern over prostate cancer. His MRI is scheduled for later in the month.  He describes the pain as in the buttocks and when he sits down. He is concerned that this is a sign of cancer spread.\par UA negative\par IPSS 11\par Pelvic US:\par PVR 30 cc\par Prostate 27 gm\par 76-year-old black male with elevated PSA and pain in the buttocks with sitting which does not appear to be related to his prostate gland either infection or malignancy, in the face of normal and stable PVR, prostate measurement, UA, PSA under 8. I discussed these findings with the patient in detail recommended we continue as planned with the MRI to evaluate his elevated PSA for possible biopsy. I reviewed with him scenarios whereby prostate cancer could cause bone pain, including the diffuse nature of such bone pain and the typically markedly elevated PSA in the 100s to thousands range in such cases. The patient seemed to be satisfied with this explanation and will proceed with the MRI as planned. We will meet review these results afterwards. \par PSA 5.72 (14%) 5/13/2022.\par \par Patient cancelled appointment for  6/17/2022 due to not yet having the MR prostate. The MR is now scheduled for 7/16/2022, FU after that.\par \par

## 2022-06-20 ENCOUNTER — NON-APPOINTMENT (OUTPATIENT)
Age: 77
End: 2022-06-20

## 2022-08-11 ENCOUNTER — APPOINTMENT (OUTPATIENT)
Dept: UROLOGY | Facility: CLINIC | Age: 77
End: 2022-08-11

## 2022-08-11 VITALS
OXYGEN SATURATION: 98 % | SYSTOLIC BLOOD PRESSURE: 154 MMHG | HEIGHT: 71 IN | WEIGHT: 140 LBS | TEMPERATURE: 96.4 F | RESPIRATION RATE: 20 BRPM | BODY MASS INDEX: 19.6 KG/M2 | HEART RATE: 93 BPM | DIASTOLIC BLOOD PRESSURE: 81 MMHG

## 2022-08-11 DIAGNOSIS — R10.2 PELVIC AND PERINEAL PAIN: ICD-10-CM

## 2022-08-11 LAB
BILIRUB UR QL STRIP: NORMAL
CLARITY UR: CLEAR
COLLECTION METHOD: NORMAL
GLUCOSE UR-MCNC: NORMAL
HCG UR QL: 0.2 EU/DL
HGB UR QL STRIP.AUTO: NORMAL
KETONES UR-MCNC: NORMAL
LEUKOCYTE ESTERASE UR QL STRIP: NORMAL
NITRITE UR QL STRIP: NORMAL
PH UR STRIP: 6
PROT UR STRIP-MCNC: NORMAL
SP GR UR STRIP: 1.02

## 2022-08-11 PROCEDURE — 81003 URINALYSIS AUTO W/O SCOPE: CPT | Mod: QW

## 2022-08-11 PROCEDURE — 99214 OFFICE O/P EST MOD 30 MIN: CPT

## 2022-08-11 NOTE — HISTORY OF PRESENT ILLNESS
[FreeTextEntry1] : 75 YO M seen 4/29/2022 as NPT for elevated PSA. He told me that he has had a PSA of ~4 since 7 years ago when he underwent a PNB for it that returned BPH by history. HE had a recent PSA done 4/22/2022 which returned 4.2 (19%). He has also had chronic pelvic pain and is concerned tat he might have prostate cancer.\par \par Patient describes his urination as weak flow but no nocturia, dysuria or hematuria. \par UA negative\par IPSS 2\par ADRIENNE 24\par KIMO 2\par Pelvic US today:\par Bladder PVR: 12 cc\par PRostate 28 gm\par 76-year-old male with a high normal for age and stable PSA in the 4 range but a abnormally low percent free PSA of 19%. Digital rectal exam today is normal. We discussed the implications of these findings as they coincide with the chance of prostate cancer. I also discussed with the patient normal ranges for PSA for his age which would include a total PSA up to 5-5.5. We also discussed the length the time of progression of prostate cancer which is measured in years or decades rather than weeks or months. I repeated the total and free PSA today for confirmation. If this remains in the same range, then I recommended to the patient that he undergo repeat evaluation with LINDA and PSA in 6 months and we planned on that follow-up. Of course we will evaluate any further abnormality as we find it. We discussed the very minimal risk that waiting 6 months would cause us to lose any window of opportunity for treatment of localized disease versus more generalized spread, especially in view of the relatively normal PSA value he has right now. We also discussed the fact that most prostate cancers carry a low likelihood of metastasis and can be observed rather than treated.\par PA again higher at 5.88, % free PA low at 15%, recommend MR prostate now, discussed by phone, ordered, FU after that to discuss results and possible PNB.\par \par Patient seen 5/13/2022 due to complaint of pelvic pain and concern over prostate cancer. His MRI is scheduled for later in the month.  He describes the pain as in the buttocks and when he sits down. He is concerned that this is a sign of cancer spread.\par UA negative\par IPSS 11\par Pelvic US:\par PVR 30 cc\par Prostate 27 gm\par 76-year-old black male with elevated PSA and pain in the buttocks with sitting which does not appear to be related to his prostate gland either infection or malignancy, in the face of normal and stable PVR, prostate measurement, UA, PSA under 8. I discussed these findings with the patient in detail recommended we continue as planned with the MRI to evaluate his elevated PSA for possible biopsy. I reviewed with him scenarios whereby prostate cancer could cause bone pain, including the diffuse nature of such bone pain and the typically markedly elevated PSA in the 100s to thousands range in such cases. The patient seemed to be satisfied with this explanation and will proceed with the MRI as planned. We will meet review these results afterwards. \par PSA 5.72 (14%) 5/13/2022.\par \par Patient cancelled appointment for  6/17/2022 due to not yet having the MR prostate. The MR is now scheduled for 7/16/2022, FU after that.\par \par MRI 8/5/2022: 33 gm prostate, PI-RADS=2 wedge-shaped regions, PSAD = 0.20. 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 \par \par Patient seen  8/11/2022 to review these results and plan next steps. He continues to have pain in the buttock.\par UA trace WBC\par IPSS 11\par ADRIENNE 24\par KIMO 2\par \par

## 2022-08-11 NOTE — ASSESSMENT
[FreeTextEntry1] : We discussed the results of the MRI scan in detail reviewing the PI-RADS scoring system and the result unlikely to be consistent with prostate cancer.  We contrasted this with PSA density of 0.20 based upon his last PSA of 6.72 and the prostate size of 33 g measured by this MRI.  This PSA density is abnormally high and of concern.  We also reviewed the findings of the right perirectal fistula noted which is likely the source of his pain.\par \par I recommended that he seek another opinion on the MRI and possible need for a biopsy with my partner Dr. Serrato and had because of the discrepant findings of a PI-RADS 2 MRI, PSA density of 0.20, PSA of 6.72 with 14% free PSA.  At the same time I asked him to speak to his PCP Acquista concerning referral for treatment of his perirectal fistula. Case discussed with Edwar who will review MRI with patient. Ella Loco MD\par

## 2022-08-17 LAB — BACTERIA UR CULT: NORMAL

## 2022-08-31 ENCOUNTER — APPOINTMENT (OUTPATIENT)
Dept: UROLOGY | Facility: CLINIC | Age: 77
End: 2022-08-31

## 2022-08-31 VITALS
SYSTOLIC BLOOD PRESSURE: 125 MMHG | DIASTOLIC BLOOD PRESSURE: 79 MMHG | OXYGEN SATURATION: 100 % | HEART RATE: 95 BPM | TEMPERATURE: 97.6 F

## 2022-08-31 DIAGNOSIS — Z87.891 PERSONAL HISTORY OF NICOTINE DEPENDENCE: ICD-10-CM

## 2022-08-31 PROCEDURE — 99214 OFFICE O/P EST MOD 30 MIN: CPT

## 2022-08-31 RX ORDER — MELOXICAM 15 MG/1
15 TABLET ORAL DAILY
Qty: 30 | Refills: 0 | Status: COMPLETED | COMMUNITY
Start: 2022-04-06 | End: 2022-08-31

## 2022-08-31 NOTE — PHYSICAL EXAM
[Penis Abnormality] : normal uncircumcised penis [No Prostate Nodules] : no prostate nodules [General Appearance - Well Developed] : well developed [General Appearance - Well Nourished] : well nourished [Normal Appearance] : normal appearance [Well Groomed] : well groomed [General Appearance - In No Acute Distress] : no acute distress [Abdomen Soft] : soft [Abdomen Tenderness] : non-tender [Costovertebral Angle Tenderness] : no ~M costovertebral angle tenderness [Urethral Meatus] : meatus normal [Urinary Bladder Findings] : the bladder was normal on palpation [Scrotum] : the scrotum was normal [Testes Tenderness] : no tenderness of the testes [Testes Mass (___cm)] : there were no testicular masses [Prostate Tenderness] : the prostate was not tender [Prostate Size ___ gm] : prostate size [unfilled] gm [Edema] : no peripheral edema [] : no respiratory distress [Respiration, Rhythm And Depth] : normal respiratory rhythm and effort [Exaggerated Use Of Accessory Muscles For Inspiration] : no accessory muscle use [Oriented To Time, Place, And Person] : oriented to person, place, and time [Affect] : the affect was normal [Mood] : the mood was normal [Not Anxious] : not anxious [Normal Station and Gait] : the gait and station were normal for the patient's age [No Focal Deficits] : no focal deficits [No Palpable Adenopathy] : no palpable adenopathy

## 2022-09-01 ENCOUNTER — NON-APPOINTMENT (OUTPATIENT)
Age: 77
End: 2022-09-01

## 2022-09-01 LAB
ANION GAP SERPL CALC-SCNC: 13 MMOL/L
APPEARANCE: CLEAR
BACTERIA: NEGATIVE
BASOPHILS # BLD AUTO: 0.02 K/UL
BASOPHILS NFR BLD AUTO: 0.3 %
BILIRUBIN URINE: NEGATIVE
BLOOD URINE: NEGATIVE
BUN SERPL-MCNC: 15 MG/DL
CALCIUM SERPL-MCNC: 10.9 MG/DL
CHLORIDE SERPL-SCNC: 103 MMOL/L
CO2 SERPL-SCNC: 28 MMOL/L
COLOR: NORMAL
CREAT SERPL-MCNC: 1.36 MG/DL
EGFR: 54 ML/MIN/1.73M2
EOSINOPHIL # BLD AUTO: 0.02 K/UL
EOSINOPHIL NFR BLD AUTO: 0.3 %
GLUCOSE QUALITATIVE U: NEGATIVE
GLUCOSE SERPL-MCNC: 107 MG/DL
HCT VFR BLD CALC: 38.5 %
HGB BLD-MCNC: 12.6 G/DL
HYALINE CASTS: 0 /LPF
IMM GRANULOCYTES NFR BLD AUTO: 0.1 %
KETONES URINE: NEGATIVE
LEUKOCYTE ESTERASE URINE: NEGATIVE
LYMPHOCYTES # BLD AUTO: 1.79 K/UL
LYMPHOCYTES NFR BLD AUTO: 24.7 %
MAN DIFF?: NORMAL
MCHC RBC-ENTMCNC: 31.7 PG
MCHC RBC-ENTMCNC: 32.7 GM/DL
MCV RBC AUTO: 96.7 FL
MICROSCOPIC-UA: NORMAL
MONOCYTES # BLD AUTO: 0.74 K/UL
MONOCYTES NFR BLD AUTO: 10.2 %
NEUTROPHILS # BLD AUTO: 4.66 K/UL
NEUTROPHILS NFR BLD AUTO: 64.4 %
NITRITE URINE: NEGATIVE
PH URINE: 6
PLATELET # BLD AUTO: 263 K/UL
POTASSIUM SERPL-SCNC: 4.8 MMOL/L
PROTEIN URINE: NEGATIVE
PSA SERPL-MCNC: 5.99 NG/ML
RBC # BLD: 3.98 M/UL
RBC # FLD: 14.9 %
RED BLOOD CELLS URINE: 1 /HPF
SODIUM SERPL-SCNC: 144 MMOL/L
SPECIFIC GRAVITY URINE: 1.01
SQUAMOUS EPITHELIAL CELLS: 0 /HPF
UROBILINOGEN URINE: NORMAL
WBC # FLD AUTO: 7.24 K/UL
WHITE BLOOD CELLS URINE: 0 /HPF

## 2022-09-01 NOTE — ASSESSMENT
[FreeTextEntry1] : 76 yo male with PSA 5.72 ng/ml, MRI with atypical nodules on review in the left and right transition zones, elevated PSAD 0.17 ng/ml/cc, one prior negative biopsy, neg FH, neg LINDA.\par \par COMMERICAL ROOM \par \par 1. Book for biopsy in 4-6 weeks. Needs GI follow up first due to MRI findings\par 2. Follow up with GI Dr Montenegro \par \par IRB Notification\par \par The patient has been made aware of the opportunity to participate in our MR US fusion guided biopsy trial testing the next generation biopsy technology.  This is an IRB approved trial (IRB #).  He is already being consented for a standard MR US fusion guided biopsy therefore there is no change in his clinical work flow.  He has been given a copy of the consent to review before the biopsy date and given an opportunity to contact us with any further questions.  He will be consented on the day of the procedure or electronically before the biopsy.\par \par He understands that many men with prostate cancer will die with the disease rather than of it and we also discussed the results large multi-center American and  prostate cancer screening trials. He also understands that PSA in and of itself does not diagnose prostate cancer but only assesses risk to a certain degree. The patient understands that to definitively screen for prostate cancer, a biopsy is required and this procedure has risks, including bleeding, infection, ED and urinary retention. The patient opted to move forward with the biopsy.\par \par The patient is aware to expect hematuria x 2 weeks and upto 4 weeks of hematospermia.  There is a risk of infection albeit much lower than a transrectal approach. In some cases patients can experience erectile dysfunction but this is usually self limiting.  Any fever/chills after the biopsy the patient is to contact the office and go to the ER for an immediate evaluation. He has been given paper instructions outlining these items - which includes medications to avoid prior to surgery.\par \par 1. CBC, BMP, PSA, Covid Test, UA UCx. EKG\par 2. Medical Clearance\par 3. TP biopsy at [facility]\par 4. follow up 2 weeks after biopsy with his primary urologist or ourselves.\par 5. we will call with the path results once they are resulted.\par \par Thank you very much for allowing me to assist in the care of this patient. Should you have any additional questions or concerns please do not hesitate to contact me.\par \par \par Sincerely,\par \par \par Alfredo Vann D.O.\par  of Urology and Radiology\par  of Urology at [facility]\par System Director for Prostate Cancer\par [address], [address] [facility], [address]\par Phone: [phone]\par \par

## 2022-09-01 NOTE — HISTORY OF PRESENT ILLNESS
[FreeTextEntry1] : Dear CASPER Conway,\par \par Thank you so much for the referral to help care for your patient.\par \par Chief Complaint Elevated PSA\par Date of first visit: 08/31/2022\par \par KELLY PAEZ is a 77 year old Black man with PMHx HTN and Crohn's who presents for elevated PSA. His PSA is 5.7 ng/ml/cc. MRI on 8/5/22 was read as negative with no suspicious lesions. His PSA density is elevated (0.17 ng/ml/cc). He has had one prior negative biopsy about 10 years ago. He denies family hx of  malignancies. His brother had surgery on his prostate but he thinks this may have just been due to BPH not cancer.\par \par PSA Hx:\par 5.72 5/13/22. PSAD 0.17 ng/ml/cc\par 5.88 4/29/22\par 4.2 4/12/22\par ~4 per patient 10 years ago\par \par MRI at [facility] on 08/05/2022. Volume 33 mL  prostate with PIRADS 2 lesion Geographic/wedge shaped region of T2 hypointensity throughout the peripheral zone with enhancement, nonspecific PIRADS 2 ( clinically significant cancer unlikely). No LAD No EPE, No Bony Lesions.  The images have been reviewed and clinical implications discussed with the patient. Perianal fistula.\par On review, there are atypical nodules on the left and right transition zones we will sample.\par \par 08/31/2022 \par IPSS 4 QOL 0\par ADRIENNE 23\par \par Prostate cancer screening: the patient and I spoke at length about prostate cancer screening, its risks and its benefits. The patient has 2 (PSA, race) risk factors for prostate cancer.  He understands that many men with prostate cancer will die with the disease rather than of it and we also discussed the results large multi-center American and  prostate cancer screening trials. He also understands that PSA in and of itself does not diagnose prostate cancer but only assesses risk to a certain degree. The patient understands that to definitively screen for prostate cancer, a biopsy is required and this procedure has risks, including bleeding, infection, ED and urinary retention. The patient opted to fusion biopsy.\par \par The patient denies fevers, chills, nausea and or vomiting and no unexplained weight loss.\par \par All pertinent laboratory, films and physician notes were reviewed.  Questionnaire results were discussed with patient.\par \par I spent 31 minutes of non-face to face time reviewing the patient's records, chart, uploading processing MR images, transferring MR images from PACS to an independent workstation, reviewing images on independent workstation, speaking with their physician and planning their prostate biopsy and preparation of an upcoming visit for 08/31/2022 .  The imaging and planning was highly complex and took this entire time.

## 2022-09-01 NOTE — ADDENDUM
[FreeTextEntry1] : I, Dr. Vann, personally performed the evaluation and management (E/M) services for this new patient.  That E/M includes conducting the initial examination, assessing all conditions, and establishing the plan of care.  Today, my ACP, Dilcia Burch, was here to observe my evaluation and management services for this patient to be followed going forward.\par

## 2022-09-02 LAB — BACTERIA UR CULT: NORMAL

## 2022-09-22 ENCOUNTER — NON-APPOINTMENT (OUTPATIENT)
Age: 77
End: 2022-09-22

## 2022-09-22 ENCOUNTER — OUTPATIENT (OUTPATIENT)
Dept: OUTPATIENT SERVICES | Facility: HOSPITAL | Age: 77
LOS: 1 days | End: 2022-09-22
Payer: SELF-PAY

## 2022-09-22 DIAGNOSIS — Z00.8 ENCOUNTER FOR OTHER GENERAL EXAMINATION: ICD-10-CM

## 2022-09-22 PROCEDURE — 76377 3D RENDER W/INTRP POSTPROCES: CPT | Mod: 26

## 2022-09-22 PROCEDURE — C8001: CPT

## 2022-09-29 ENCOUNTER — APPOINTMENT (OUTPATIENT)
Dept: UROLOGY | Facility: AMBULATORY SURGERY CENTER | Age: 77
End: 2022-09-29

## 2022-09-30 NOTE — DISCHARGE NOTE PROVIDER - NSDCQMCOGNITION_NEU_ALL_CORE
PHYSICAL THERAPY TREATMENT NOTE  NAME:  Leonor Coffey  DATE: 09/30/22    Length Of Stay: 9  Performed at least 2 patient identifiers during session: Name and Birthday  Treatment time: [id number]  Treatment length: 38 min       09/30/22 0951   Note Type   Note Type Treatment   Pain Assessment   Pain Assessment Tool 0-10   Pain Score No Pain   Restrictions/Precautions   Other Precautions Bed Alarm;O2;Fall Risk  (4 lpm)   General   Chart Reviewed Yes   Response to Previous Treatment Patient with no complaints from previous session  Cognition   Overall Cognitive Status WFL   Orientation Level Oriented X4   Following Commands Follows one step commands without difficulty   Bed Mobility   Rolling R 2  Maximal assistance   Additional items Assist x 1; Increased time required;Verbal cues   Rolling L 2  Maximal assistance   Additional items Assist x 1; Increased time required;Verbal cues   Additional Comments max x 1 for rolling L and R, once obtained, pt able to maintain with min-mod A for side lying  Used chair function on Kreg bed to obtain sitting EOB   Transfers   Sit to Stand 2  Maximal assistance   Additional items Assist x 2; Increased time required;Verbal cues   Stand to Sit 2  Maximal assistance   Additional items Assist x 2; Increased time required;Verbal cues   Stand pivot Unable to assess   Additional Comments STS with RW, B/L knees blocked from bed in chair position, max A x 2 for force production with pt able to achieve 3/4 stand x 30 sec despite max VC for upright posture  Pt unable to complete marching in place for weight shifting, requiring seated rest break   Pt also fearful of sliding off end of bed, requesting to return to supine   Ambulation/Elevation   Gait pattern Not appropriate   Balance   Static Sitting Fair   Dynamic Sitting Fair -   Static Standing Poor -   Endurance Deficit   Endurance Deficit Yes   Endurance Deficit Description fatigue, strength deficits   Activity Tolerance   Activity Tolerance Patient limited by fatigue   Medical Staff Made Aware OTJoey, COSME, Lonzo Cheadle   Nurse Made Aware Kerry MIRELES   Exercises   Quad Sets Supine;10 reps;AROM; Bilateral   Glute Sets Sitting;10 reps;AROM; Bilateral   Hip Abduction Sitting;10 reps;AROM; Bilateral   Ankle Pumps Sitting;10 reps;AROM; Bilateral   Assessment   Prognosis Guarded   Problem List Decreased strength;Decreased range of motion;Decreased endurance; Impaired balance;Decreased mobility;Obesity; Decreased skin integrity   Barriers to Discharge Inaccessible home environment;Decreased caregiver support   Barriers to Discharge Comments Pt is A x 2 for transfers, requires increased assistance for mobility   Goals   PT Treatment Day 2   Plan   Treatment/Interventions Functional transfer training;LE strengthening/ROM; Therapeutic exercise; Endurance training;Patient/family training;Equipment eval/education; Bed mobility;Gait training; Compensatory technique education;Spoke to nursing;OT   Progress Slow progress, decreased activity tolerance   PT Frequency 3-5x/wk   Recommendation   PT Discharge Recommendation Post acute rehabilitation services   Equipment Recommended   (TBD by rehab)   AM-PAC Basic Mobility Inpatient   Turning in Bed Without Bedrails 2   Lying on Back to Sitting on Edge of Flat Bed 1   Moving Bed to Chair 1   Standing Up From Chair 1   Walk in Room 1   Climb 3-5 Stairs 1   Basic Mobility Inpatient Raw Score 7   Turning Head Towards Sound 4   Follow Simple Instructions 4   Low Function Basic Mobility Raw Score 15   Low Function Basic Mobility Standardized Score 23 9   Highest Level Of Mobility   JH-HLM Goal 2: Bed activities/Dependent transfer   JH-HLM Achieved 3: Sit at edge of bed   Education   Education Provided Mobility training   Patient Demonstrates acceptance/verbal understanding   End of Consult   Patient Position at End of Consult Supine;Bed/Chair alarm activated; All needs within reach     The patient's Brianne [place]ves Short Form Raw Score is 7  A Raw score of less than or equal to 16 suggests the patient may benefit from discharge to post-acute rehabilitation services  Please also refer to the recommendation of the Physical Therapist for safe discharge planning  Assessment: Pt seen for PT treatment session this date with interventions consisting of Therapeutic exercise consisting of: AROM 10 reps B LE in sitting and supine position and therapeutic activity consisting of training: bed mobility and sit<>stand transfers  Pt agreeable to PT treatment session upon arrival, pt found supine in bed w/ HOB elevated, in no apparent distress  In comparison to previous session, pt with improvements in pt able to complete rolling with max x 1, able to achieve 3/4 standing with RW and max A x 2 and complete assigned therex   B LE HEP handout provided and reviewed for Charis as demonstrated above  and Post session: pt returned BTB, bed alarm engaged, all needs in reach, and RN notified of session findings/recommendations Continue to recommend STR at time of d/c in order to maximize pt's functional independence and safety w/ mobility  Pt continues to be functioning below baseline level, and remains limited 2* factors listed above and including decreased strength, balance, mobility, and activity tolerance  PT will continue to see pt while here in order to address the deficits listed above and provide interventions consistent w/ POC in effort to achieve STGs       Kristy Nichole No difficulties

## 2022-12-05 ENCOUNTER — APPOINTMENT (OUTPATIENT)
Dept: INTERNAL MEDICINE | Facility: CLINIC | Age: 77
End: 2022-12-05

## 2022-12-15 ENCOUNTER — RESULT CHARGE (OUTPATIENT)
Age: 77
End: 2022-12-15

## 2022-12-16 ENCOUNTER — NON-APPOINTMENT (OUTPATIENT)
Age: 77
End: 2022-12-16

## 2022-12-16 ENCOUNTER — APPOINTMENT (OUTPATIENT)
Dept: INTERNAL MEDICINE | Facility: CLINIC | Age: 77
End: 2022-12-16

## 2022-12-16 VITALS
BODY MASS INDEX: 20.02 KG/M2 | SYSTOLIC BLOOD PRESSURE: 164 MMHG | HEIGHT: 71 IN | DIASTOLIC BLOOD PRESSURE: 84 MMHG | WEIGHT: 143 LBS | HEART RATE: 87 BPM | TEMPERATURE: 98 F | OXYGEN SATURATION: 99 %

## 2022-12-16 VITALS — SYSTOLIC BLOOD PRESSURE: 149 MMHG | DIASTOLIC BLOOD PRESSURE: 72 MMHG

## 2022-12-16 DIAGNOSIS — Z01.818 ENCOUNTER FOR OTHER PREPROCEDURAL EXAMINATION: ICD-10-CM

## 2022-12-16 DIAGNOSIS — Z23 ENCOUNTER FOR IMMUNIZATION: ICD-10-CM

## 2022-12-16 PROCEDURE — 36415 COLL VENOUS BLD VENIPUNCTURE: CPT

## 2022-12-16 PROCEDURE — 99213 OFFICE O/P EST LOW 20 MIN: CPT | Mod: 25

## 2022-12-16 RX ORDER — OLMESARTAN MEDOXOMIL 5 MG/1
5 TABLET, FILM COATED ORAL
Refills: 0 | Status: ACTIVE | COMMUNITY

## 2022-12-17 PROBLEM — Z23 ENCOUNTER FOR IMMUNIZATION: Status: ACTIVE | Noted: 2022-12-17

## 2022-12-17 PROBLEM — Z01.818 PREOP TESTING: Status: ACTIVE | Noted: 2021-04-24

## 2022-12-19 LAB
ANION GAP SERPL CALC-SCNC: 12 MMOL/L
APPEARANCE: CLEAR
BACTERIA UR CULT: NORMAL
BASOPHILS # BLD AUTO: 0.02 K/UL
BASOPHILS NFR BLD AUTO: 0.4 %
BILIRUBIN URINE: NEGATIVE
BLOOD URINE: NEGATIVE
BUN SERPL-MCNC: 16 MG/DL
CALCIUM SERPL-MCNC: 10.1 MG/DL
CHLORIDE SERPL-SCNC: 105 MMOL/L
CO2 SERPL-SCNC: 25 MMOL/L
COLOR: COLORLESS
CREAT SERPL-MCNC: 1.34 MG/DL
EGFR: 55 ML/MIN/1.73M2
EOSINOPHIL # BLD AUTO: 0.05 K/UL
EOSINOPHIL NFR BLD AUTO: 1 %
GLUCOSE QUALITATIVE U: NEGATIVE
GLUCOSE SERPL-MCNC: 112 MG/DL
HCT VFR BLD CALC: 38 %
HGB BLD-MCNC: 12.5 G/DL
IMM GRANULOCYTES NFR BLD AUTO: 0.2 %
KETONES URINE: NEGATIVE
LEUKOCYTE ESTERASE URINE: NEGATIVE
LYMPHOCYTES # BLD AUTO: 1.76 K/UL
LYMPHOCYTES NFR BLD AUTO: 33.5 %
MAN DIFF?: NORMAL
MCHC RBC-ENTMCNC: 32 PG
MCHC RBC-ENTMCNC: 32.9 GM/DL
MCV RBC AUTO: 97.2 FL
MONOCYTES # BLD AUTO: 0.54 K/UL
MONOCYTES NFR BLD AUTO: 10.3 %
NEUTROPHILS # BLD AUTO: 2.87 K/UL
NEUTROPHILS NFR BLD AUTO: 54.6 %
NITRITE URINE: NEGATIVE
PH URINE: 6
PLATELET # BLD AUTO: 197 K/UL
POTASSIUM SERPL-SCNC: 4.6 MMOL/L
PROTEIN URINE: NEGATIVE
RBC # BLD: 3.91 M/UL
RBC # FLD: 13.6 %
SODIUM SERPL-SCNC: 143 MMOL/L
SPECIFIC GRAVITY URINE: 1.01
UROBILINOGEN URINE: NORMAL
WBC # FLD AUTO: 5.25 K/UL

## 2022-12-19 RX ORDER — MESALAMINE 1.2 G/1
1.2 TABLET, DELAYED RELEASE ORAL DAILY
Refills: 0 | Status: ACTIVE | COMMUNITY

## 2022-12-19 RX ORDER — AMLODIPINE BESYLATE 5 MG/1
5 TABLET ORAL
Refills: 0 | Status: ACTIVE | COMMUNITY

## 2022-12-19 NOTE — RESULTS/DATA
[] : not indicated [de-identified] : H/H 12.5/38.0% [de-identified] : BUN/Cr 16/1.34 [de-identified] : EKG NSR 76 bpm [de-identified] : Ucx -negative

## 2022-12-19 NOTE — HISTORY OF PRESENT ILLNESS
[No Pertinent Cardiac History] : no history of aortic stenosis, atrial fibrillation, coronary artery disease, recent myocardial infarction, or implantable device/pacemaker [No Pertinent Pulmonary History] : no history of asthma, COPD, sleep apnea, or smoking [No Adverse Anesthesia Reaction] : no adverse anesthesia reaction in self or family member [(Patient denies any chest pain, claudication, dyspnea on exertion, orthopnea, palpitations or syncope)] : Patient denies any chest pain, claudication, dyspnea on exertion, orthopnea, palpitations or syncope [Good (7-10 METs)] : Good (7-10 METs) [Chronic Anticoagulation] : no chronic anticoagulation [Chronic Kidney Disease] : no chronic kidney disease [Diabetes] : no diabetes [FreeTextEntry1] : prostate fusion biopsy [FreeTextEntry2] : 12/22/22 [FreeTextEntry3] : Dr. Vann [FreeTextEntry4] : Pt is a 78 y/o M with PMHx of HTN, chron's disease who presents to the office today for preop clearance for prostate fusion biopsy on 12/22/22\par  [FreeTextEntry7] : EKG today- NSR

## 2022-12-19 NOTE — PHYSICAL EXAM
[No Edema] : there was no peripheral edema [No Rash] : no rash [Normal] : affect was normal and insight and judgment were intact [de-identified] : + R sided Tonsillar lymphadenopathy [de-identified] : Surgical scar

## 2022-12-19 NOTE — ASSESSMENT
[High Risk Surgery - Intraperitoneal, Intrathoracic or Supringuinal Vascular Procedures] : High Risk Surgery - Intraperitoneal, Intrathoracic or Supringuinal Vascular Procedures - No (0) [Ischemic Heart Disease] : Ischemic Heart Disease - No (0) [Congestive Heart Failure] : Congestive Heart Failure - No (0) [Prior Cerebrovascular Accident or TIA] : Prior Cerebrovascular Accident or TIA - No (0) [Creatinine >= 2mg/dL (1 Point)] : Creatinine >= 2mg/dL - No (0) [Insulin-dependent Diabetic (1 Point)] : Insulin-dependent Diabetic - No (0) [0] : 0 , RCRI Class: I, Risk of Post-Op Cardiac Complications: 3.9%, 95% CI for Risk Estimate: 2.8% - 5.4% [Patient Optimized for Surgery] : Patient optimized for surgery [No Further Testing Recommended] : no further testing recommended [As per surgery] : as per surgery [FreeTextEntry4] : Pt is LOW risk for LOW risk procedure. Pt may proceed with elective surgery

## 2022-12-21 ENCOUNTER — TRANSCRIPTION ENCOUNTER (OUTPATIENT)
Age: 77
End: 2022-12-21

## 2022-12-21 NOTE — ASU PATIENT PROFILE, ADULT - NS PREOP UNDERSTANDS INFO
no No solid food for 10 hours before surgery, Can have sips of water/ clear liquid for up to 4 hours before surgery/yes

## 2022-12-21 NOTE — ASU PATIENT PROFILE, ADULT - FALL HARM RISK - UNIVERSAL INTERVENTIONS
Bed in lowest position, wheels locked, appropriate side rails in place/Call bell, personal items and telephone in reach/Instruct patient to call for assistance before getting out of bed or chair/Non-slip footwear when patient is out of bed/[place] to call system/Physically safe environment - no spills, clutter or unnecessary equipment/Purposeful Proactive Rounding/Room/bathroom lighting operational, light cord in reach

## 2022-12-21 NOTE — ASU PATIENT PROFILE, ADULT - BARIATRIC
Here for suture removal - discussed results (most c/w PRP, but may have eczematous component - ?atypical type), patient feeling better with topical steriods, wishes to continue this treatment for now, RX renewed with 1 additional cream. I mentioned options of acitretin vs MTX (concerned about acitretin 2/2 dry eye), she would like to hold off for now    Will call if not improving, schedule f/u for treatment, discussion of alopecia universalis                Review of Systems     Physical Exam         
no

## 2022-12-21 NOTE — ASU PATIENT PROFILE, ADULT - NSICDXPASTSURGICALHX_GEN_ALL_CORE_FT
PAST SURGICAL HISTORY:  History of colon resection 20000    History of ear, nose, and throat (ENT) surgery 2020

## 2022-12-22 ENCOUNTER — APPOINTMENT (OUTPATIENT)
Dept: UROLOGY | Facility: AMBULATORY SURGERY CENTER | Age: 77
End: 2022-12-22

## 2022-12-22 ENCOUNTER — OUTPATIENT (OUTPATIENT)
Dept: OUTPATIENT SERVICES | Facility: HOSPITAL | Age: 77
LOS: 1 days | Discharge: HOME | End: 2022-12-22
Payer: COMMERCIAL

## 2022-12-22 ENCOUNTER — RESULT REVIEW (OUTPATIENT)
Age: 77
End: 2022-12-22

## 2022-12-22 ENCOUNTER — TRANSCRIPTION ENCOUNTER (OUTPATIENT)
Age: 77
End: 2022-12-22

## 2022-12-22 VITALS
DIASTOLIC BLOOD PRESSURE: 63 MMHG | RESPIRATION RATE: 15 BRPM | TEMPERATURE: 98 F | OXYGEN SATURATION: 100 % | SYSTOLIC BLOOD PRESSURE: 127 MMHG | HEIGHT: 69 IN | WEIGHT: 136.69 LBS | HEART RATE: 67 BPM

## 2022-12-22 VITALS — OXYGEN SATURATION: 100 % | RESPIRATION RATE: 13 BRPM | HEART RATE: 53 BPM | TEMPERATURE: 97 F

## 2022-12-22 DIAGNOSIS — Z90.49 ACQUIRED ABSENCE OF OTHER SPECIFIED PARTS OF DIGESTIVE TRACT: Chronic | ICD-10-CM

## 2022-12-22 DIAGNOSIS — Z98.890 OTHER SPECIFIED POSTPROCEDURAL STATES: Chronic | ICD-10-CM

## 2022-12-22 PROCEDURE — 55706 BX PRST8 NDL SAT SAMPLING: CPT | Mod: AS,22

## 2022-12-22 PROCEDURE — G0416: CPT | Mod: 26

## 2022-12-22 PROCEDURE — 55706 BX PRST8 NDL SAT SAMPLING: CPT | Mod: 22

## 2022-12-22 PROCEDURE — 76872 US TRANSRECTAL: CPT | Mod: 26

## 2022-12-22 PROCEDURE — 76377 3D RENDER W/INTRP POSTPROCES: CPT | Mod: 26

## 2022-12-22 RX ORDER — OLMESARTAN MEDOXOMIL 5 MG/1
1 TABLET, FILM COATED ORAL
Qty: 0 | Refills: 0 | DISCHARGE

## 2022-12-22 RX ORDER — AMLODIPINE BESYLATE 2.5 MG/1
1 TABLET ORAL
Qty: 0 | Refills: 0 | DISCHARGE

## 2022-12-22 RX ORDER — ACETAMINOPHEN 500 MG
1000 TABLET ORAL ONCE
Refills: 0 | Status: DISCONTINUED | OUTPATIENT
Start: 2022-12-22 | End: 2022-12-22

## 2022-12-22 RX ORDER — MESALAMINE 400 MG
4 TABLET, DELAYED RELEASE (ENTERIC COATED) ORAL
Qty: 0 | Refills: 0 | DISCHARGE

## 2022-12-22 RX ORDER — SODIUM CHLORIDE 9 MG/ML
1000 INJECTION, SOLUTION INTRAVENOUS
Refills: 0 | Status: DISCONTINUED | OUTPATIENT
Start: 2022-12-22 | End: 2022-12-22

## 2022-12-22 NOTE — ASU DISCHARGE PLAN (ADULT/PEDIATRIC) - ASU DC SPECIAL INSTRUCTIONSFT
Expect Blood in stool and urine for the next few days. Change dressing and keep covered as necessary with gauze and bacitracin. For pain take tylenol as directed on bottle every 4-6 hours for pain. Ice to area as needed.    ** see post op instruction sheet **

## 2022-12-22 NOTE — ASU DISCHARGE PLAN (ADULT/PEDIATRIC) - NS MD DC FALL RISK RISK
For information on Fall & Injury Prevention, visit: [email or web address] OR  [email or web address] OR  https://www.cdc.gov/steadi/patient.html

## 2022-12-22 NOTE — ASU DISCHARGE PLAN (ADULT/PEDIATRIC) - CARE PROVIDER_API CALL
Bradley Vann (DO)  Urology  [address], 5th Floor [facility], [place] [facility]  Phone: [phone]  Fax: [phone]  Follow Up Time:

## 2022-12-22 NOTE — BRIEF OPERATIVE NOTE - NSICDXBRIEFPROCEDURE_GEN_ALL_CORE_FT
PROCEDURES:  Biopsy of prostate by transperineal approach with integrated magnetic resonance-ultrasound guidance 22-Dec-2022 15:12:43  Dina Rose

## 2022-12-23 ENCOUNTER — NON-APPOINTMENT (OUTPATIENT)
Age: 77
End: 2022-12-23

## 2022-12-27 PROBLEM — Z87.19 PERSONAL HISTORY OF OTHER DISEASES OF THE DIGESTIVE SYSTEM: Chronic | Status: ACTIVE | Noted: 2022-12-21

## 2022-12-27 PROBLEM — I10 ESSENTIAL (PRIMARY) HYPERTENSION: Chronic | Status: ACTIVE | Noted: 2022-12-21

## 2023-01-03 ENCOUNTER — NON-APPOINTMENT (OUTPATIENT)
Age: 78
End: 2023-01-03

## 2023-01-03 LAB — SURGICAL PATHOLOGY STUDY: SIGNIFICANT CHANGE UP

## 2023-01-10 ENCOUNTER — APPOINTMENT (OUTPATIENT)
Dept: UROLOGY | Facility: CLINIC | Age: 78
End: 2023-01-10

## 2023-01-24 ENCOUNTER — APPOINTMENT (OUTPATIENT)
Dept: UROLOGY | Facility: CLINIC | Age: 78
End: 2023-01-24
Payer: MEDICARE

## 2023-01-24 VITALS
SYSTOLIC BLOOD PRESSURE: 151 MMHG | HEART RATE: 76 BPM | RESPIRATION RATE: 16 BRPM | HEIGHT: 71 IN | BODY MASS INDEX: 19.88 KG/M2 | DIASTOLIC BLOOD PRESSURE: 85 MMHG | WEIGHT: 142 LBS

## 2023-01-24 DIAGNOSIS — R97.20 ELEVATED PROSTATE, SPECIFIC ANTIGEN [PSA]: ICD-10-CM

## 2023-01-24 PROCEDURE — 99213 OFFICE O/P EST LOW 20 MIN: CPT

## 2023-01-24 NOTE — ASSESSMENT
[FreeTextEntry1] : 76 yo male with PSA 5.72 ng/ml, MRI with atypical nodules on review in the left and right transition zones, elevated PSAD 0.17 ng/ml/cc, one prior negative biopsy, neg FH, neg LINDA.   UroNav MRI fusion on 12/22/22. The biopsy was NEGATIVE for cancer. 14/14 cores BPH.\par \par 1. PSA in June 2023\par 2. we are going to be conservative given the patient's age\par 3. he also lives near [facility] and would like to be seen here.\par \par Thank you very much for allowing me to assist in the care of this patient. Should you have any additional questions or concerns please do not hesitate to contact me.\par \par \par Sincerely,\par \par \par Alfredo Vann D.O.\par Professor of Urology and Radiology\par  of Urology at [facility]\par System Director for Prostate Cancer\par [address] [place] [address], 5th Floor [facility]\par Phone: [phone]\par

## 2023-01-24 NOTE — HISTORY OF PRESENT ILLNESS
[FreeTextEntry1] : Dear CASPER Conway,\par \par Thank you so much for the referral to help care for your patient.\par \par Chief Complaint Elevated PSA\par Date of first visit: 08/31/2022\par \par KELLY PAEZ is a 77 year old Black man with PMHx HTN and Crohn's who presents for elevated PSA. His PSA is 5.7 ng/ml/cc. MRI on 8/5/22 was read as negative with no suspicious lesions. His PSA density is elevated (0.17 ng/ml/cc). He has had one prior negative biopsy about 10 years ago. He denies family hx of  malignancies. His brother had surgery on his prostate but he thinks this may have just been due to BPH not cancer.\par \par  UroNav MRI fusion on 12/22/22. The biopsy was NEGATIVE for cancer. 14/14 cores BPH.  We reviewed the MRI again and it was low risk and we can follow the patient's PSA and he agrees with the plan.\par \par PSA Hx:\par 5.99  8/31/22\par 5.72 5/13/22. PSAD 0.17 ng/ml/cc\par 5.88 4/29/22\par 4.2 4/12/22\par ~4 per patient 10 years ago\par \par Biopsy Hx:\par 12/22/2022 with Dr. CASTANO. - benign\par \par MRI at [facility] on 08/05/2022. Volume 33 mL  prostate with PIRADS 2 lesion Geographic/wedge shaped region of T2 hypointensity throughout the peripheral zone with enhancement, nonspecific PIRADS 2 ( clinically significant cancer unlikely). No LAD No EPE, No Bony Lesions.  The images have been reviewed and clinical implications discussed with the patient. Perianal fistula.\par On review, there are atypical nodules on the left and right transition zones we will sample.\par \par 01/24/2023\par IPSS    QOL\par ADRIENNE\par \par 08/31/2022 \par IPSS 4 QOL 0\par ADRIENNE 23\par \par Prostate cancer screening: the patient and I spoke at length about prostate cancer screening, its risks and its benefits. The patient has 2 (PSA, race) risk factors for prostate cancer.  He understands that many men with prostate cancer will die with the disease rather than of it and we also discussed the results large multi-center American and  prostate cancer screening trials. He also understands that PSA in and of itself does not diagnose prostate cancer but only assesses risk to a certain degree. The patient understands that to definitively screen for prostate cancer, a biopsy is required and this procedure has risks, including bleeding, infection, ED and urinary retention. The patient opted to follow his PSAs.\par \par The patient denies fevers, chills, nausea and or vomiting and no unexplained weight loss.\par \par All pertinent laboratory, films and physician notes were reviewed.  Questionnaire results were discussed with patient.

## 2023-06-26 NOTE — ASSESSMENT
[FreeTextEntry1] : 78 yo male with PSA 5.72 ng/ml, MRI with atypical nodules on review in the left and right transition zones, elevated PSAD 0.17 ng/ml/cc, one prior negative biopsy, neg FH, neg LINDA.   UroNav MRI fusion on 12/22/22. The biopsy was NEGATIVE for cancer. 14/14 cores BPH.\par \par Elevated PSA\par - PSA due today (June 2023)\par - we are going to be conservative given the patient's age\par \par \par Thank you very much for allowing me to assist in the care of this patient. Should you have any additional questions or concerns please do not hesitate to contact me.\par \par \par Sincerely,\par \par \par Alfredo Vann D.O.\par Professor of Urology and Radiology\par  of Urology at [facility]\par System Director for Prostate Cancer\par [address] [place] [address], 5th Floor [facility], [address]\par Phone: [phone]

## 2023-06-26 NOTE — HISTORY OF PRESENT ILLNESS
[FreeTextEntry1] : Dear CASPER Conway,\par \par Thank you so much for the referral to help care for your patient.\par \par Chief Complaint Elevated PSA\par Date of first visit: 08/31/2022\par \par KELLY PAEZ is a 77 year old Black man with PMHx HTN and Crohn's who presents for elevated PSA. His PSA is 5.7 ng/ml/cc. MRI on 8/5/22 was read as negative with no suspicious lesions. His PSA density is elevated (0.17 ng/ml/cc). He has had one prior negative biopsy about 10 years ago. He denies family hx of  malignancies. His brother had surgery on his prostate but he thinks this may have just been due to BPH not cancer.\par \par  UroNav MRI fusion on 12/22/22. The biopsy was NEGATIVE for cancer. 14/14 cores BPH.  We reviewed the MRI again and it was low risk and we can follow the patient's PSA and he agrees with the plan.\par \par PSA Hx:\par 5.99  8/31/22\par 5.72 5/13/22. PSAD 0.17 ng/ml/cc\par 5.88 4/29/22\par 4.2 4/12/22\par ~4 per patient 10 years ago\par \par Biopsy Hx:\par 12/22/2022 with Dr. CASTANO. - benign\par \par MRI at [place] on 08/05/2022. Volume 33 mL  prostate with PIRADS 2 lesion Geographic/wedge shaped region of T2 hypointensity throughout the peripheral zone with enhancement, nonspecific PIRADS 2 ( clinically significant cancer unlikely). No LAD No EPE, No Bony Lesions.  The images have been reviewed and clinical implications discussed with the patient. Perianal fistula.\par On review, there are atypical nodules on the left and right transition zones we will sample.\par \par 06/27/2023\par IPSS     QOL\par ADRIENNE\par \par 01/24/2023\par IPSS    QOL\par ADRIENNE\par \par 08/31/2022 \par IPSS 4 QOL 0\par ADRIENNE 23\par \par Prostate cancer screening: the patient and I spoke at length about prostate cancer screening, its risks and its benefits. The patient has 2 (PSA, race) risk factors for prostate cancer.  He understands that many men with prostate cancer will die with the disease rather than of it and we also discussed the results large multi-center American and  prostate cancer screening trials. He also understands that PSA in and of itself does not diagnose prostate cancer but only assesses risk to a certain degree. The patient understands that to definitively screen for prostate cancer, a biopsy is required and this procedure has risks, including bleeding, infection, ED and urinary retention. The patient opted to follow his PSAs.\par \par The patient denies fevers, chills, nausea and or vomiting and no unexplained weight loss.\par \par All pertinent laboratory, films and physician notes were reviewed.  Questionnaire results were discussed with patient.

## 2023-06-27 ENCOUNTER — APPOINTMENT (OUTPATIENT)
Dept: UROLOGY | Facility: CLINIC | Age: 78
End: 2023-06-27

## 2025-01-31 ENCOUNTER — APPOINTMENT (OUTPATIENT)
Dept: UROLOGY | Facility: CLINIC | Age: 80
End: 2025-01-31
Payer: MEDICARE

## 2025-01-31 ENCOUNTER — NON-APPOINTMENT (OUTPATIENT)
Age: 80
End: 2025-01-31

## 2025-01-31 VITALS
SYSTOLIC BLOOD PRESSURE: 143 MMHG | OXYGEN SATURATION: 98 % | HEART RATE: 96 BPM | DIASTOLIC BLOOD PRESSURE: 69 MMHG | TEMPERATURE: 97.1 F

## 2025-01-31 DIAGNOSIS — N40.2 NODULAR PROSTATE W/OUT LOWER URINARY TRACT SYMPTOMS: ICD-10-CM

## 2025-01-31 DIAGNOSIS — R97.20 ELEVATED PROSTATE, SPECIFIC ANTIGEN [PSA]: ICD-10-CM

## 2025-01-31 LAB
BILIRUB UR QL STRIP: NEGATIVE
CLARITY UR: CLEAR
COLLECTION METHOD: NORMAL
GLUCOSE UR-MCNC: NEGATIVE
HCG UR QL: 0.2 EU/DL
HGB UR QL STRIP.AUTO: NEGATIVE
KETONES UR-MCNC: NEGATIVE
LEUKOCYTE ESTERASE UR QL STRIP: NEGATIVE
NITRITE UR QL STRIP: NEGATIVE
PH UR STRIP: 5.5
PROT UR STRIP-MCNC: NEGATIVE
SP GR UR STRIP: 1.02

## 2025-01-31 PROCEDURE — 99214 OFFICE O/P EST MOD 30 MIN: CPT

## 2025-01-31 PROCEDURE — 51798 US URINE CAPACITY MEASURE: CPT

## 2025-02-01 LAB
PSA FREE FLD-MCNC: 17 %
PSA FREE SERPL-MCNC: 0.87 NG/ML
PSA SERPL-MCNC: 5.17 NG/ML

## 2025-03-10 ENCOUNTER — RESULT REVIEW (OUTPATIENT)
Age: 80
End: 2025-03-10

## 2025-03-10 ENCOUNTER — OUTPATIENT (OUTPATIENT)
Dept: OUTPATIENT SERVICES | Facility: HOSPITAL | Age: 80
LOS: 1 days | End: 2025-03-10

## 2025-03-10 ENCOUNTER — APPOINTMENT (OUTPATIENT)
Dept: MRI IMAGING | Facility: CLINIC | Age: 80
End: 2025-03-10
Payer: MEDICARE

## 2025-03-10 DIAGNOSIS — Z98.890 OTHER SPECIFIED POSTPROCEDURAL STATES: Chronic | ICD-10-CM

## 2025-03-10 DIAGNOSIS — Z90.49 ACQUIRED ABSENCE OF OTHER SPECIFIED PARTS OF DIGESTIVE TRACT: Chronic | ICD-10-CM

## 2025-03-10 PROCEDURE — 76498P: CUSTOM | Mod: 26

## 2025-03-10 PROCEDURE — 72197 MRI PELVIS W/O & W/DYE: CPT | Mod: 26

## 2025-03-14 ENCOUNTER — NON-APPOINTMENT (OUTPATIENT)
Age: 80
End: 2025-03-14

## 2025-05-14 ENCOUNTER — APPOINTMENT (OUTPATIENT)
Dept: UROLOGY | Facility: CLINIC | Age: 80
End: 2025-05-14
Payer: MEDICARE

## 2025-05-14 DIAGNOSIS — R97.20 ELEVATED PROSTATE, SPECIFIC ANTIGEN [PSA]: ICD-10-CM

## 2025-05-14 DIAGNOSIS — N40.2 NODULAR PROSTATE W/OUT LOWER URINARY TRACT SYMPTOMS: ICD-10-CM

## 2025-05-14 PROCEDURE — 99214 OFFICE O/P EST MOD 30 MIN: CPT

## 2025-05-21 ENCOUNTER — NON-APPOINTMENT (OUTPATIENT)
Age: 80
End: 2025-05-21

## 2025-05-21 LAB
PSA FREE FLD-MCNC: 12 %
PSA FREE SERPL-MCNC: 0.99 NG/ML
PSA SERPL-MCNC: 8.09 NG/ML

## 2025-08-19 ENCOUNTER — APPOINTMENT (OUTPATIENT)
Dept: UROLOGY | Facility: CLINIC | Age: 80
End: 2025-08-19
Payer: MEDICARE

## 2025-08-19 VITALS
HEART RATE: 69 BPM | DIASTOLIC BLOOD PRESSURE: 77 MMHG | WEIGHT: 142 LBS | TEMPERATURE: 97 F | BODY MASS INDEX: 19.88 KG/M2 | HEIGHT: 71 IN | SYSTOLIC BLOOD PRESSURE: 150 MMHG | OXYGEN SATURATION: 100 %

## 2025-08-19 PROCEDURE — 99215 OFFICE O/P EST HI 40 MIN: CPT

## (undated) DEVICE — BALLOON ENDOCAVITY 2X14CM

## (undated) DEVICE — DRAPE MEDIUM SHEET 44" X 70"

## (undated) DEVICE — NDL BIOPSY CHIBA 22G X 20CM

## (undated) DEVICE — SYR LUER LOK 50CC

## (undated) DEVICE — NDL MAX CORE 18G X 25CM

## (undated) DEVICE — PLASTIC SOLUTION BOWL 160Z

## (undated) DEVICE — PREP CHLORAPREP HI-LITE ORANGE 26ML

## (undated) DEVICE — SYR CATH TIP 2 OZ

## (undated) DEVICE — GRID BRACHYTHERAPY EZ 18G

## (undated) DEVICE — SYR LUER LOK 10CC

## (undated) DEVICE — NDL HYPO REGULAR BEVEL 25G X 1.5" (BLUE)

## (undated) DEVICE — DRAPE TOWEL BLUE 17" X 24"

## (undated) DEVICE — GLV 8 PROTEXIS (WHITE)

## (undated) DEVICE — DRSG TELFA 3 X 8